# Patient Record
Sex: MALE | Race: BLACK OR AFRICAN AMERICAN | NOT HISPANIC OR LATINO | URBAN - METROPOLITAN AREA
[De-identification: names, ages, dates, MRNs, and addresses within clinical notes are randomized per-mention and may not be internally consistent; named-entity substitution may affect disease eponyms.]

---

## 2017-12-12 ENCOUNTER — EMERGENCY (EMERGENCY)
Facility: HOSPITAL | Age: 42
LOS: 1 days | Discharge: PSYCHIATRIC OR SUBSTANCE USE FACILITY | End: 2017-12-12
Attending: EMERGENCY MEDICINE | Admitting: HOSPITALIST
Payer: MEDICAID

## 2017-12-12 VITALS
DIASTOLIC BLOOD PRESSURE: 85 MMHG | HEART RATE: 96 BPM | TEMPERATURE: 98 F | OXYGEN SATURATION: 98 % | RESPIRATION RATE: 17 BRPM | SYSTOLIC BLOOD PRESSURE: 144 MMHG

## 2017-12-12 PROBLEM — Z00.00 ENCOUNTER FOR PREVENTIVE HEALTH EXAMINATION: Status: ACTIVE | Noted: 2017-12-12

## 2017-12-12 PROCEDURE — 93010 ELECTROCARDIOGRAM REPORT: CPT | Mod: 59

## 2017-12-12 PROCEDURE — 99053 MED SERV 10PM-8AM 24 HR FAC: CPT

## 2017-12-12 PROCEDURE — 99285 EMERGENCY DEPT VISIT HI MDM: CPT | Mod: 25

## 2017-12-12 NOTE — ED ADULT TRIAGE NOTE - CHIEF COMPLAINT QUOTE
pt brought in with police, handcuffed, for acting aggressive and bizarre at home. pt is endorsing feelings of paranoia and "dasia vu". mother states pt has never had behavior like this before. pt denies etoh and illicit drug use. pt c/o of subjective fevers earlier today. denies SI/HI  BH NP called to triage. pt to be seen medically. Charge Rn called. pt brought in with police, handcuffed, for acting aggressive and bizarre at home. pt is endorsing feelings of paranoia and "dasia vu". mother states pt has never had behavior like this before. pt denies etoh and illicit drug use. pt c/o of subjective fevers earlier today. denies SI/HI   NP called to triage. pt to be seen medically. Charge Rn called.  Pt brought to  to change in hospital gown and pants. belongings secured in . wallet given to mother. pt in ambay awaiting room assignment.

## 2017-12-13 DIAGNOSIS — Z29.9 ENCOUNTER FOR PROPHYLACTIC MEASURES, UNSPECIFIED: ICD-10-CM

## 2017-12-13 DIAGNOSIS — R41.82 ALTERED MENTAL STATUS, UNSPECIFIED: ICD-10-CM

## 2017-12-13 DIAGNOSIS — Z86.59 PERSONAL HISTORY OF OTHER MENTAL AND BEHAVIORAL DISORDERS: ICD-10-CM

## 2017-12-13 DIAGNOSIS — F29 UNSPECIFIED PSYCHOSIS NOT DUE TO A SUBSTANCE OR KNOWN PHYSIOLOGICAL CONDITION: ICD-10-CM

## 2017-12-13 DIAGNOSIS — F79 UNSPECIFIED INTELLECTUAL DISABILITIES: ICD-10-CM

## 2017-12-13 LAB
ALBUMIN SERPL ELPH-MCNC: 4.9 G/DL — SIGNIFICANT CHANGE UP (ref 3.3–5)
ALP SERPL-CCNC: 95 U/L — SIGNIFICANT CHANGE UP (ref 40–120)
ALT FLD-CCNC: 16 U/L — SIGNIFICANT CHANGE UP (ref 4–41)
AMMONIA BLD-MCNC: 21 UMOL/L — SIGNIFICANT CHANGE UP (ref 11–55)
AMPHET UR-MCNC: NEGATIVE — SIGNIFICANT CHANGE UP
APAP SERPL-MCNC: < 15 UG/ML — LOW (ref 15–25)
APPEARANCE UR: CLEAR — SIGNIFICANT CHANGE UP
AST SERPL-CCNC: 23 U/L — SIGNIFICANT CHANGE UP (ref 4–40)
B PERT DNA SPEC QL NAA+PROBE: SIGNIFICANT CHANGE UP
BARBITURATES MEASUREMENT: NEGATIVE — SIGNIFICANT CHANGE UP
BARBITURATES UR SCN-MCNC: NEGATIVE — SIGNIFICANT CHANGE UP
BASE EXCESS BLDV CALC-SCNC: 3.6 MMOL/L — SIGNIFICANT CHANGE UP
BASOPHILS # BLD AUTO: 0.04 K/UL — SIGNIFICANT CHANGE UP (ref 0–0.2)
BASOPHILS NFR BLD AUTO: 0.4 % — SIGNIFICANT CHANGE UP (ref 0–2)
BENZODIAZ SERPL-MCNC: NEGATIVE — SIGNIFICANT CHANGE UP
BENZODIAZ UR-MCNC: NEGATIVE — SIGNIFICANT CHANGE UP
BILIRUB SERPL-MCNC: 2.2 MG/DL — HIGH (ref 0.2–1.2)
BILIRUB UR-MCNC: NEGATIVE — SIGNIFICANT CHANGE UP
BLOOD GAS VENOUS - CREATININE: 1.16 MG/DL — SIGNIFICANT CHANGE UP (ref 0.5–1.3)
BLOOD UR QL VISUAL: NEGATIVE — SIGNIFICANT CHANGE UP
BUN SERPL-MCNC: 15 MG/DL — SIGNIFICANT CHANGE UP (ref 7–23)
C PNEUM DNA SPEC QL NAA+PROBE: NOT DETECTED — SIGNIFICANT CHANGE UP
CALCIUM SERPL-MCNC: 9.7 MG/DL — SIGNIFICANT CHANGE UP (ref 8.4–10.5)
CANNABINOIDS UR-MCNC: NEGATIVE — SIGNIFICANT CHANGE UP
CHLORIDE BLDV-SCNC: 105 MMOL/L — SIGNIFICANT CHANGE UP (ref 96–108)
CHLORIDE SERPL-SCNC: 103 MMOL/L — SIGNIFICANT CHANGE UP (ref 98–107)
CO2 SERPL-SCNC: 25 MMOL/L — SIGNIFICANT CHANGE UP (ref 22–31)
COCAINE METAB.OTHER UR-MCNC: NEGATIVE — SIGNIFICANT CHANGE UP
COLOR SPEC: YELLOW — SIGNIFICANT CHANGE UP
CREAT SERPL-MCNC: 1.2 MG/DL — SIGNIFICANT CHANGE UP (ref 0.5–1.3)
EOSINOPHIL # BLD AUTO: 0.1 K/UL — SIGNIFICANT CHANGE UP (ref 0–0.5)
EOSINOPHIL NFR BLD AUTO: 1.1 % — SIGNIFICANT CHANGE UP (ref 0–6)
ETHANOL BLD-MCNC: < 10 MG/DL — SIGNIFICANT CHANGE UP
FLUAV H1 2009 PAND RNA SPEC QL NAA+PROBE: NOT DETECTED — SIGNIFICANT CHANGE UP
FLUAV H1 RNA SPEC QL NAA+PROBE: NOT DETECTED — SIGNIFICANT CHANGE UP
FLUAV H3 RNA SPEC QL NAA+PROBE: NOT DETECTED — SIGNIFICANT CHANGE UP
FLUAV SUBTYP SPEC NAA+PROBE: SIGNIFICANT CHANGE UP
FLUBV RNA SPEC QL NAA+PROBE: NOT DETECTED — SIGNIFICANT CHANGE UP
GAS PNL BLDV: 138 MMOL/L — SIGNIFICANT CHANGE UP (ref 136–146)
GLUCOSE BLDV-MCNC: 108 — HIGH (ref 70–99)
GLUCOSE SERPL-MCNC: 106 MG/DL — HIGH (ref 70–99)
GLUCOSE UR-MCNC: NEGATIVE — SIGNIFICANT CHANGE UP
HADV DNA SPEC QL NAA+PROBE: NOT DETECTED — SIGNIFICANT CHANGE UP
HCO3 BLDV-SCNC: 26 MMOL/L — SIGNIFICANT CHANGE UP (ref 20–27)
HCOV 229E RNA SPEC QL NAA+PROBE: NOT DETECTED — SIGNIFICANT CHANGE UP
HCOV HKU1 RNA SPEC QL NAA+PROBE: NOT DETECTED — SIGNIFICANT CHANGE UP
HCOV NL63 RNA SPEC QL NAA+PROBE: NOT DETECTED — SIGNIFICANT CHANGE UP
HCOV OC43 RNA SPEC QL NAA+PROBE: NOT DETECTED — SIGNIFICANT CHANGE UP
HCT VFR BLD CALC: 40.4 % — SIGNIFICANT CHANGE UP (ref 39–50)
HCT VFR BLDV CALC: 43.9 % — SIGNIFICANT CHANGE UP (ref 39–51)
HGB BLD-MCNC: 14 G/DL — SIGNIFICANT CHANGE UP (ref 13–17)
HGB BLDV-MCNC: 14.3 G/DL — SIGNIFICANT CHANGE UP (ref 13–17)
HMPV RNA SPEC QL NAA+PROBE: NOT DETECTED — SIGNIFICANT CHANGE UP
HPIV1 RNA SPEC QL NAA+PROBE: NOT DETECTED — SIGNIFICANT CHANGE UP
HPIV2 RNA SPEC QL NAA+PROBE: NOT DETECTED — SIGNIFICANT CHANGE UP
HPIV3 RNA SPEC QL NAA+PROBE: NOT DETECTED — SIGNIFICANT CHANGE UP
HPIV4 RNA SPEC QL NAA+PROBE: NOT DETECTED — SIGNIFICANT CHANGE UP
IMM GRANULOCYTES # BLD AUTO: 0.01 # — SIGNIFICANT CHANGE UP
IMM GRANULOCYTES NFR BLD AUTO: 0.1 % — SIGNIFICANT CHANGE UP (ref 0–1.5)
KETONES UR-MCNC: HIGH
LACTATE BLDV-MCNC: 1.8 MMOL/L — SIGNIFICANT CHANGE UP (ref 0.5–2)
LEUKOCYTE ESTERASE UR-ACNC: NEGATIVE — SIGNIFICANT CHANGE UP
LYMPHOCYTES # BLD AUTO: 2.12 K/UL — SIGNIFICANT CHANGE UP (ref 1–3.3)
LYMPHOCYTES # BLD AUTO: 23.4 % — SIGNIFICANT CHANGE UP (ref 13–44)
M PNEUMO DNA SPEC QL NAA+PROBE: NOT DETECTED — SIGNIFICANT CHANGE UP
MCHC RBC-ENTMCNC: 29.9 PG — SIGNIFICANT CHANGE UP (ref 27–34)
MCHC RBC-ENTMCNC: 34.7 % — SIGNIFICANT CHANGE UP (ref 32–36)
MCV RBC AUTO: 86.1 FL — SIGNIFICANT CHANGE UP (ref 80–100)
METHADONE UR-MCNC: NEGATIVE — SIGNIFICANT CHANGE UP
MONOCYTES # BLD AUTO: 0.78 K/UL — SIGNIFICANT CHANGE UP (ref 0–0.9)
MONOCYTES NFR BLD AUTO: 8.6 % — SIGNIFICANT CHANGE UP (ref 2–14)
MUCOUS THREADS # UR AUTO: SIGNIFICANT CHANGE UP
NEUTROPHILS # BLD AUTO: 6 K/UL — SIGNIFICANT CHANGE UP (ref 1.8–7.4)
NEUTROPHILS NFR BLD AUTO: 66.4 % — SIGNIFICANT CHANGE UP (ref 43–77)
NITRITE UR-MCNC: NEGATIVE — SIGNIFICANT CHANGE UP
NON-SQ EPI CELLS # UR AUTO: <1 — SIGNIFICANT CHANGE UP
NRBC # FLD: 0.02 — SIGNIFICANT CHANGE UP
OPIATES UR-MCNC: NEGATIVE — SIGNIFICANT CHANGE UP
OXYCODONE UR-MCNC: NEGATIVE — SIGNIFICANT CHANGE UP
PCO2 BLDV: 48 MMHG — SIGNIFICANT CHANGE UP (ref 41–51)
PCP UR-MCNC: NEGATIVE — SIGNIFICANT CHANGE UP
PH BLDV: 7.39 PH — SIGNIFICANT CHANGE UP (ref 7.32–7.43)
PH UR: 6 — SIGNIFICANT CHANGE UP (ref 4.6–8)
PLATELET # BLD AUTO: 294 K/UL — SIGNIFICANT CHANGE UP (ref 150–400)
PMV BLD: 10.6 FL — SIGNIFICANT CHANGE UP (ref 7–13)
PO2 BLDV: 38 MMHG — SIGNIFICANT CHANGE UP (ref 35–40)
POTASSIUM BLDV-SCNC: 3.7 MMOL/L — SIGNIFICANT CHANGE UP (ref 3.4–4.5)
POTASSIUM SERPL-MCNC: 4.3 MMOL/L — SIGNIFICANT CHANGE UP (ref 3.5–5.3)
POTASSIUM SERPL-SCNC: 4.3 MMOL/L — SIGNIFICANT CHANGE UP (ref 3.5–5.3)
PROT SERPL-MCNC: 8 G/DL — SIGNIFICANT CHANGE UP (ref 6–8.3)
PROT UR-MCNC: 30 MG/DL — HIGH
RBC # BLD: 4.69 M/UL — SIGNIFICANT CHANGE UP (ref 4.2–5.8)
RBC # FLD: 13.1 % — SIGNIFICANT CHANGE UP (ref 10.3–14.5)
RBC CASTS # UR COMP ASSIST: SIGNIFICANT CHANGE UP (ref 0–?)
RSV RNA SPEC QL NAA+PROBE: NOT DETECTED — SIGNIFICANT CHANGE UP
RV+EV RNA SPEC QL NAA+PROBE: NOT DETECTED — SIGNIFICANT CHANGE UP
SALICYLATES SERPL-MCNC: < 5 MG/DL — LOW (ref 15–30)
SAO2 % BLDV: 66.7 % — SIGNIFICANT CHANGE UP (ref 60–85)
SODIUM SERPL-SCNC: 144 MMOL/L — SIGNIFICANT CHANGE UP (ref 135–145)
SP GR SPEC: > 1.04 — HIGH (ref 1–1.04)
TSH SERPL-MCNC: 3.1 UIU/ML — SIGNIFICANT CHANGE UP (ref 0.27–4.2)
UROBILINOGEN FLD QL: NORMAL MG/DL — SIGNIFICANT CHANGE UP
WBC # BLD: 9.05 K/UL — SIGNIFICANT CHANGE UP (ref 3.8–10.5)
WBC # FLD AUTO: 9.05 K/UL — SIGNIFICANT CHANGE UP (ref 3.8–10.5)
WBC UR QL: SIGNIFICANT CHANGE UP (ref 0–?)

## 2017-12-13 PROCEDURE — 70496 CT ANGIOGRAPHY HEAD: CPT | Mod: 26

## 2017-12-13 PROCEDURE — 70498 CT ANGIOGRAPHY NECK: CPT | Mod: 26,52

## 2017-12-13 RX ORDER — DIPHENHYDRAMINE HCL 50 MG
50 CAPSULE ORAL EVERY 6 HOURS
Qty: 0 | Refills: 0 | Status: DISCONTINUED | OUTPATIENT
Start: 2017-12-13 | End: 2017-12-14

## 2017-12-13 RX ORDER — DIPHENHYDRAMINE HCL 50 MG
50 CAPSULE ORAL EVERY 6 HOURS
Qty: 0 | Refills: 0 | Status: DISCONTINUED | OUTPATIENT
Start: 2017-12-13 | End: 2017-12-13

## 2017-12-13 RX ORDER — INFLUENZA VIRUS VACCINE 15; 15; 15; 15 UG/.5ML; UG/.5ML; UG/.5ML; UG/.5ML
0.5 SUSPENSION INTRAMUSCULAR ONCE
Qty: 0 | Refills: 0 | Status: DISCONTINUED | OUTPATIENT
Start: 2017-12-13 | End: 2017-12-14

## 2017-12-13 RX ORDER — HALOPERIDOL DECANOATE 100 MG/ML
5 INJECTION INTRAMUSCULAR EVERY 6 HOURS
Qty: 0 | Refills: 0 | Status: DISCONTINUED | OUTPATIENT
Start: 2017-12-13 | End: 2017-12-14

## 2017-12-13 RX ADMIN — Medication 2 MILLIGRAM(S): at 09:00

## 2017-12-13 NOTE — H&P ADULT - PROBLEM SELECTOR PLAN 1
Patient with psychotic features of paranoia and hallucinations the last two days. Currently he is not displaying either, however there is unclear etiology for change in mental status  - Serum Tox, Urine Tox, Ammonia, and TSH all WNL  - Awaiting BCx and UCx results  - CT Neuro imaging unremarkable  - Lower on the differential may be Autoimmune encephalitis, however patient is lucid right now and had received Ativan this morning. Will await for rest of blood tests to result and evaluate for necessity of Neurology consultation and LP.

## 2017-12-13 NOTE — ED BEHAVIORAL HEALTH ASSESSMENT NOTE - SUMMARY
Patient is a 42 year old male, domiciled, employed, non-caregiver, with no clear PPH but with Developmental Delay (unclear severity), no known prior psychiatric hospitalizations, no current outpatient treatment, denies hx of self harm behaviors, denies prior suicide attempts, denies hx of violence or arrests, denies trauma, denies substance use/abuse, with no relevant past medical history but recent subjective report of fevers, brought in by EMS, presenting with new onset paranoia, VH, and aggression.    Pt with new onset paranoia, possible VH, recent aggression, and initially with disorientation and subjective report of recent fevers (in ER 99.3F). While a primary psychotic disorder cannot be ruled out, pt should have a thorough Patient is a 42 year old male, domiciled, employed, non-caregiver, with no clear PPH but with Developmental Delay (unclear severity), no known prior psychiatric hospitalizations, no current outpatient treatment, denies hx of self harm behaviors, denies prior suicide attempts, denies hx of violence or arrests, denies trauma, denies substance use/abuse, with no relevant past medical history but recent subjective report of fevers, brought in by EMS, presenting with new onset paranoia, VH, and aggression.    Pt with new onset paranoia, possible VH, recent aggression, and initially with disorientation and subjective report of recent fevers (in ER 99.3F). While a primary psychotic disorder cannot be ruled out, pt should have a thorough medical work up at this time to rule out organic etiology of symptoms. Of note, pt is significantly intellectually limited and struggles to clearly describe his symptoms and feelings. Patient is a 42 year old male, domiciled, employed, non-caregiver, with no clear PPH but with Developmental Delay (unclear severity), no known prior psychiatric hospitalizations, no current outpatient treatment, denies hx of self harm behaviors, denies prior suicide attempts, denies hx of violence or arrests, denies trauma, denies substance use/abuse, with no relevant past medical history but recent subjective report of fevers, brought in by EMS, presenting with new onset paranoia, VH, and aggression.    Pt with new onset paranoia, possible VH, recent aggression, and initially with disorientation and subjective report of recent fevers (in ER 99.3F). While a primary psychotic disorder cannot be ruled out, it is unlikely given pt's age and lack of psychotic symptoms/diagnosis prior to this episode. Discussed with medical attending and it was determined that pt should have a thorough medical work up at this time to rule out organic etiology of symptoms. Of note, pt is significantly intellectually limited and struggles to clearly describe his symptoms and feelings, resulting in unclear interview and possibility that pt is at his baseline with social anxiety, descriptive imagination, concrete thinking, and poor impulse control/frustration tolerance. Further information must be obtained from pt's mother or other sources to determine his true baseline prior to this incident.

## 2017-12-13 NOTE — ED PROVIDER NOTE - OBJECTIVE STATEMENT
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  Pt missed work the past two days states, "he sees people chasing him."  Pt was missing for two days called parents who picked him up at a [employer].  Pt became aggressive and required restraint was brought to the ED by PD. PT arrives AO to person and place, believes it is 1978. No neck stiffness, photophobia, rash, fever, headache, nausea, or vomiting.  PT describes a history of a head strike by an elevator at work 1 month ago and left sided neck pain today.
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
## 2017-12-13 NOTE — H&P ADULT - ASSESSMENT
42M hx of Developmental Delay presented to [facility] ED for c/o visual hallucinations. Patient being admitted for medical workup for psychosis 42M hx of Developmental Delay and Depression presented to [facility] ED for c/o visual hallucinations. Patient being admitted for medical workup for psychosis

## 2017-12-13 NOTE — ED PROVIDER NOTE - MEDICAL DECISION MAKING DETAILS
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  VSS WNL.  Exam unremarkable.  No signs of infection, no fever, travel, or exposure.  Meningitis/encephalitis unlikely.  Given visual hallucinations delirium high on the differential.  DDX Toxicity, hypothyroidism, encephalopathy, traumatic neck injury although there are no signs of trauma on exam, R/O carotid injury.  Primary psychosis diagnosis of exclusion.  Rule out medical causes, clear for psychiatric evaluation, treat symptomatically.  Admit.

## 2017-12-13 NOTE — ED BEHAVIORAL HEALTH ASSESSMENT NOTE - DIFFERENTIAL
Organic etiology of symptoms (such as anti-NMDA receptor encephalitis) vs Primary psychotic disorder vs Intellectual disability with pt at baseline functioning (pt's mother stated this is not the case but unclear how reliable she is at this time)

## 2017-12-13 NOTE — H&P ADULT - PROBLEM SELECTOR PLAN 3
Hx of intellectual disability, was in special education as a child. Consent for any future procedures and disposition will need to involve patient's HCP.

## 2017-12-13 NOTE — H&P ADULT - NSHPLABSRESULTS_GEN_ALL_CORE
CBC Full  -  ( 13 Dec 2017 01:56 )  WBC Count : 9.05 K/uL  Hemoglobin : 14.0 g/dL  Hematocrit : 40.4 %  Platelet Count - Automated : 294 K/uL  Mean Cell Volume : 86.1 fL  Mean Cell Hemoglobin : 29.9 pg  Mean Cell Hemoglobin Concentration : 34.7 %  Auto Neutrophil # : 6.00 K/uL  Auto Lymphocyte # : 2.12 K/uL  Auto Monocyte # : 0.78 K/uL  Auto Eosinophil # : 0.10 K/uL  Auto Basophil # : 0.04 K/uL  Auto Neutrophil % : 66.4 %  Auto Lymphocyte % : 23.4 %  Auto Monocyte % : 8.6 %  Auto Eosinophil % : 1.1 %  Auto Basophil % : 0.4 %    12-13    144  |  103  |  15  ----------------------------<  106<H>  4.3   |  25  |  1.20    Ca    9.7      13 Dec 2017 01:56    TPro  8.0  /  Alb  4.9  /  TBili  2.2<H>  /  DBili  x   /  AST  23  /  ALT  16  /  AlkPhos  95  12-13    Thyroid Stimulating Hormone, Serum (12.13.17 @ 01:56)    Thyroid Stimulating Hormone, Serum: 3.10 uIU/mL    Ammonia, Serum (12.13.17 @ 01:54)    Ammonia, Serum: 21    Toxicology Screen, Drugs of Abuse, Urine (12.13.17 @ 07:12)    Phencyclidine Level, Urine: NEGATIVE    Amphetamine, Urine: NEGATIVE    Barbiturates Screen, Urine: NEGATIVE    Benzodiazepine, Urine: NEGATIVE    Cannabinoids, Urine: NEGATIVE    Cocaine Metabolite, Urine: NEGATIVE    Methadone, Urine: NEGATIVE    Opiate, Urine: NEGATIVE    Oxycodone, Urine: NEGATIVE    Toxicology Screen, Drugs of Abuse, Serum (12.13.17 @ 01:56)    Barbiturates Measurement: NEGATIVE    Benzodiazepines: NEGATIVE    Rapid Respiratory Viral Panel (12.13.17 @ 01:56)    Adenovirus (RapRVP): NOT DETECTED    Influenza A (RapRVP): NOT DETECTED (any subtype)    Influenza AH1 2009 (RapRVP): NOT DETECTED    Influenza AH1 (RapRVP): NOT DETECTED    Influenza AH3 (RapRVP): NOT DETECTED    Influenza B (RapRVP): NOT DETECTED    Parainfluenza 1 (RapRVP): NOT DETECTED    Parainfluenza 2 (RapRVP): NOT DETECTED    Parainfluenza 3 (RapRVP): NOT DETECTED    Parainfluenza 4 (RapRVP): NOT DETECTED    Resp Syncytial Virus (RapRVP): NOT DETECTED    Bordetella pertussis (RapRVP): NOT DETECTED    Chlamydia pneumoniae (RapRVP): NOT DETECTED    Mycoplasma pneumoniae (RapRVP): NOT DETECTED     Entero/Rhinovirus (RapRVP): NOT DETECTED    HKU1 Coronavirus (RapRVP): NOT DETECTED    NL63 Coronavirus (RapRVP): NOT DETECTED    229E Coronavirus (RapRVP): NOT DETECTED    OC43 Coronavirus (RapRVP): NOT DETECTED    hMPV (RapRVP): NOT DETECTED    < from: CT Angio Neck w/ IV Cont (12.13.17 @ 02:20) >  IMPRESSION:  Noncontrast head CT:   No acute intracranial hemorrhage, mass effect or evidence of acute   territorial infarct.  CTA head and neck: Unremarkable examinations. No major vessel occlusion,   hemodynamically significant stenosis, dissection or aneurysm in the head   or neck.  Evaluation for stenosis in the neck is in keeping with the NASCET   criteria.  NANDA MICHEL M.D., RADIOLOGIST  This document has been electronically signed. Dec 13 2017  4:08AM  < end of copied text >

## 2017-12-13 NOTE — H&P ADULT - NSHPPHYSICALEXAM_GEN_ALL_CORE
Vital Signs Last 24 Hrs  T(C): 37.4 (13 Dec 2017 05:39), Max: 37.4 (13 Dec 2017 05:39)  T(F): 99.3 (13 Dec 2017 05:39), Max: 99.3 (13 Dec 2017 05:39)  HR: 78 (13 Dec 2017 05:39) (78 - 96)  BP: 137/81 (13 Dec 2017 05:39) (137/81 - 149/90)  BP(mean): --  RR: 18 (13 Dec 2017 05:39) (17 - 18)  SpO2: 100% (13 Dec 2017 05:39) (98% - 100%) Vital Signs Last 24 Hrs  T(C): 37.4 (13 Dec 2017 05:39), Max: 37.4 (13 Dec 2017 05:39)  T(F): 99.3 (13 Dec 2017 05:39), Max: 99.3 (13 Dec 2017 05:39)  HR: 78 (13 Dec 2017 05:39) (78 - 96)  BP: 137/81 (13 Dec 2017 05:39) (137/81 - 149/90)  BP(mean): --  RR: 18 (13 Dec 2017 05:39) (17 - 18)  SpO2: 100% (13 Dec 2017 05:39) (98% - 100%)    General: NAD, non-toxic appearing, seems to ponder answers to questions first before answering them  HEENT: EOMI, PERRLA, no conjunctival pallor, MMM, no JVD, no thyromegaly, neck supple, trachea midline  CV: S1S2 RRR no MRG  Lungs: CTA BL  Abdomen: soft NTND +BS   Extremities: No CCE +WWP  Skin/MSK: No rashes, preserved ROM on active & passive movement  Neuro: AAOx3, no focal deficits (5/5 strength all extremities), no sensory deficits

## 2017-12-13 NOTE — ED BEHAVIORAL HEALTH ASSESSMENT NOTE - DETAILS
Pt was aggressive with mom today [id number] VM left, signed out on T-drive Attempted to contact pt's mother, SERGIO left

## 2017-12-13 NOTE — PROGRESS NOTE BEHAVIORAL HEALTH - NSBHCHARTREVIEWLAB_PSY_A_CORE FT
14.0   9.05  )-----------( 294      ( 13 Dec 2017 01:56 )             40.4     13 Dec 2017 01:56    144    |  103    |  15     ----------------------------<  106    4.3     |  25     |  1.20     Ca    9.7        13 Dec 2017 01:56    TPro  8.0    /  Alb  4.9    /  TBili  2.2    /  DBili  x      /  AST  23     /  ALT  16     /  AlkPhos  95     13 Dec 2017 01:56    Toxicology Screen, Drugs of Abuse, Urine (12.13.17 @ 07:12)    Phencyclidine Level, Urine: NEGATIVE    Amphetamine, Urine: NEGATIVE: PH = 5.5    Barbiturates Screen, Urine: NEGATIVE    Benzodiazepine, Urine: NEGATIVE    Cannabinoids, Urine: NEGATIVE    Cocaine Metabolite, Urine: NEGATIVE    Methadone, Urine: NEGATIVE    Opiate, Urine: NEGATIVE    Oxycodone, Urine: NEGATIVE:    Toxicology Screen, Drugs of Abuse, Serum (12.13.17 @ 01:56)    Barbiturates Measurement: NEGATIVE    Benzodiazepines: NEGATIVE:   TEST                       CUT OFF VALUE  ----                       -------------  BARBITURATES               <  0.5 ug/mL  BENZODIAZEPINES            <  200 ng/mL  Acetaminophen Level, Serum: < 15.0: ACETAMINOPHEN VALUES ARE RELATED TO TIME OF INGESTION.

## 2017-12-13 NOTE — ED PROVIDER NOTE - PROGRESS NOTE DETAILS
JW PT now AOx3 states he feels someone is on the phone talking to his parents when he is speaking with them.  Pt states he has feelings of Gladis Vu like this has all happened before.

## 2017-12-13 NOTE — ED PROVIDER NOTE - ATTENDING CONTRIBUTION TO CARE
43 yo with DD presenting with family for a period of erratic and atypical behavior for this patient.  Family was not present during my history.  The patient reports a number of different pervasive paranoias of people talking about him and thinking other people know something about him that they are not telling him.  Possibly had a fever the other day.  Otherwise denies any pain, NVD or recent travel.  Denies all tob and substance abuse.  Does report occasional ETOH.  No recent trauma.  Gen: Well appearing in NAD  Head: NC/AT  Neck: trachea midline  Resp:  No distress CTAB  CV: RRR  Ext: no deformities  Neuro:  A&O appears non focal  Skin:  Warm and dry as visualized  Psych:  Off affect, slow speech, perseverates on his own voice, no SI HI AH or VH    pt with presentation concerning for a delirium.  Will do broad workup for metabolic causes.  This is mor likely than psych as an etiology.    CT and labs and serum tox are all negative.  Pt still not at baseline.  Will require admission for social and inpatient psych eval.  Per  team they do not feel that the patient currently meets criteria for involuntary admission.

## 2017-12-13 NOTE — ED BEHAVIORAL HEALTH ASSESSMENT NOTE - DESCRIPTION
Patient was calm and cooperative in the ED and did not exhibit any aggression. Pt did not require any prn medications or any physical restraints.     Vital Signs Last 24 Hrs  T(C): 37.4 (13 Dec 2017 05:39), Max: 37.4 (13 Dec 2017 05:39)  T(F): 99.3 (13 Dec 2017 05:39), Max: 99.3 (13 Dec 2017 05:39)  HR: 78 (13 Dec 2017 05:39) (78 - 96)  BP: 137/81 (13 Dec 2017 05:39) (137/81 - 149/90)  BP(mean): --  RR: 18 (13 Dec 2017 05:39) (17 - 18)  SpO2: 100% (13 Dec 2017 05:39) (98% - 100%) See HPI Developmental Delay

## 2017-12-13 NOTE — ED BEHAVIORAL HEALTH ASSESSMENT NOTE - RISK ASSESSMENT
Risk factors: Single, male, developmental disability, anxiety, impulsivity, paranoia, occupational decline, absence of outpatient follow-up, limited insight into symptoms, difficulty expressing thoughts/symptoms/emotions.     Protective factors: Denies any active suicidal ideation/intent/plan, no hx of prior attempts, no self-harm behaviors, has responsibility to family and others, identifies reasons for living, future oriented, supportive social network or family, no active substance use, no access to firearms, no legal issues, no hx of abuse and motivation to participate in care.

## 2017-12-13 NOTE — PROGRESS NOTE BEHAVIORAL HEALTH - CASE SUMMARY
41 y/o employed M w/ intellectual disability, who was missing for two days until being found by his parents at a [employer] and then proceeding to become aggressive, was brought in to ED. In ED he reported new onset paranoia, VH, and aggression. Patient was admitted to medicine for medical workup of psychotic symptoms. Psychiatry consulted to assess and treat psychotic sx. Pt. seen and evaluated, AAOX3. Pt. was cooperative after receiving PRN medication. He stated that he lives with his cousin. Pt. was not able to  recall/express what he was doing PTA. He stated that he remembered feeling sick and stated that eh took Advil for fever. Pt. denies AH and VH, was giggling during an interview. Denies SI and HI. Pt. reported some vague paranoia about staff at work. When asked if he is feeling safe in the hospital, he gave very vague answers. Patient denies taking any medications at home, no stiffness noted on exam. Patient is somewhat limited and inconsistent historian, giving concrete answers. Not clear what pt.'s baseline is, also it is unclear if the reported sxs are part of his intellectual disability. It is unlikely that pt. has acute onset of psychosis at this age, will recommend to r/o underlying medical causes contributing to current sxs. Differential includes underlying medical etiologies vs 43 y/o employed M w/ intellectual disability, who was missing for two days until being found by his parents at a [employer] and then proceeding to become aggressive, was brought in to ED. In ED he reported new onset paranoia, VH, and aggression. Patient was admitted to medicine for medical workup of psychotic symptoms. Psychiatry consulted to assess and treat psychotic sx. Pt. seen and evaluated, AAOX3. Pt. was cooperative after receiving PRN medication. He stated that he lives with his cousin. Pt. was not able to  recall/express what he was doing PTA. He stated that he remembered feeling sick and that he took Advil for fever. Pt. denies AH and VH, was giggling during an interview. Denies SI and HI. Pt. reported some  paranoia about staff at work. When asked if he is feeling safe in the hospital, he gave very vague answers. Patient denies taking any medications at home, denies use of any street drugs, reported drinks alcohol socially, last drink was a month back. No stiffness or rigidity noted on exam. Patient is somewhat limited and inconsistent/poor historian, giving concrete and limited answers. Not clear what pt.'s baseline is, also it is unclear if the reported sxs are part of his intellectual disability. It is unlikely that pt. has first onset of psychosis at this age, will recommend to r/o underlying medical causes contributing to current sxs. Differential includes underlying medical etiologies vs intellectual impairment with behavioral issues vs delirium (pt. thought it was 1978 in ER, now AAOX3)  vs psychosis vs substance intoxication/withdrawal. Recommend PRNs as above, collateral needed for family, left  message for mother. Case discussed with primary team, Dr. Nathan, will follow

## 2017-12-13 NOTE — ED BEHAVIORAL HEALTH ASSESSMENT NOTE - OTHER
Normal while in ER, previously impaired when pt ran away for 2 days [place] Unclear if pt is experiencing VH/AH at this time; Pt's reports change between providers and description of experiences are not conclusive. Unclear

## 2017-12-13 NOTE — ED PROVIDER NOTE - CARE PLAN
Principal Discharge DX:	Altered mental status, unspecified altered mental status type  Secondary Diagnosis:	Paranoia

## 2017-12-13 NOTE — PROGRESS NOTE BEHAVIORAL HEALTH - SUMMARY
41 y/o employed M w/ intellectual disability, who was missing for two days until being found by his parents at a [employer] and then proceeding to become aggressive, was brought in to ED in handcuffs by police. In ED he reported new onset paranoia, VH, and aggression. Patient was admitted to medicine for medical workup of psychotic symptoms. Psychiatry consulted to assess and treat psychotic sx.    On initial evaluation patient was a very poor historian, struggling to recall things from even last week and at times seemed to be fabricating answers to questions. He was also somewhat disoriented (thought it was 1978) but reported being unable to work for the past few days to paranoia.     Today he was initially agitated (yelling and clenching fists w/o any clear target) and required PRN medication. He was later amendable to interview and found to be A&Ox3. He provides some He endorses paranoia PTA of unclear duration. He denied AV hallucinations but at times appeared to be responding to internal stimuli. He reports poor sleep but cannot further clarify. Again attempted to contact patient's mother and left voicemail to obtain collateral to further assess this patient who is a somewhat limited and inconsistent historian.    Will continue w/ Haldol 5/Ativan 2/Benadryl 50 PO/IV/IM q6h for agitation. Will consider standing medication once collateral is obtained. 41 y/o employed M w/ intellectual disability, who was missing for two days until being found by his parents at a [employer] and then proceeding to become aggressive, was brought in to ED in handcuffs by police. In ED he reported new onset paranoia, VH, and aggression. Patient was admitted to medicine for medical workup of psychotic symptoms. Psychiatry consulted to assess and treat psychotic sx.    On initial evaluation patient was a very poor historian, struggling to recall things from even last week and at times seemed to be fabricating answers to questions. He was also somewhat disoriented (thought it was 1978) but reported being unable to work for the past few days to paranoia.     Today he was initially agitated (yelling and clenching fists w/o any clear target) and required PRN medication. He was later amendable to interview and found to be A&Ox3. He does not recall being away from home PTA though cannot recall/express what he was doing PTA. He endorses paranoia PTA of unclear duration. He denied AV hallucinations but at times appeared to be responding to internal stimuli. He reports poor sleep but cannot further clarify. Again attempted to contact patient's mother and left voicemail to obtain collateral to further assess this patient who is a somewhat limited and inconsistent historian.    Will continue w/ Haldol 5/Ativan 2/Benadryl 50 PO/IV/IM q6h for agitation. Will consider standing medication once collateral is obtained. 43 y/o employed M w/ intellectual disability, who was missing for two days until being found by his parents at a [employer] and then proceeding to become aggressive, was brought in to ED in handcuffs by police. In ED he reported new onset paranoia, VH, and aggression. Patient was admitted to medicine for medical workup of psychotic symptoms. Psychiatry consulted to assess and treat psychotic sx.    On initial evaluation patient was a very poor historian, struggling to recall things from even last week and at times seemed to be fabricating answers to questions. He was also somewhat disoriented (thought it was 1978) but reported being unable to work for the past few days due to paranoia.     Today he was initially agitated (yelling and clenching fists w/o any clear target) and required PRN medication. He was later amendable to interview and found to be A&Ox3. He does not recall being away from home PTA though cannot recall/express what he was doing PTA. He endorses paranoia PTA of unclear duration. He denied AV hallucinations but at times appeared to be responding to internal stimuli. He reports poor sleep but cannot further clarify. Again attempted to contact patient's mother and left voicemail to obtain collateral to further assess this patient who is a somewhat limited and inconsistent historian.    Will continue w/ Haldol 5/Ativan 2/Benadryl 50 PO/IV/IM q6h for agitation. Will consider standing medication once collateral is obtained.

## 2017-12-13 NOTE — H&P ADULT - HISTORY OF PRESENT ILLNESS
42M hx of Developmental Delay presented to [facility] ED for c/o visual hallucinations 42M hx of Developmental Delay, Depression presented to [facility] ED for c/o visual hallucinations. As per patient, for the last 2-3 days the patient did not report to work because he states that he has been "seeing people" vijay him driving him to be paranoid. The patient said he went to hide, and then his parents found him with the help of the police at a [employer]. He has never had issues with paranoia or hallucinations prior to this. During the interview the patient also would periodically ask over and over again "can I go to room 513?" When asked the significance of the room, the patient would blankly stare and not give a reason. He denies any pain, visual changes, cough, diarrhea, constipation, dysuria at this time    Patient also notes, years ago he was taking pills for depression but when he stopped feeling depressed his doctor stopped prescribing him those pills. He has not seen his doctor "in a very long time."

## 2017-12-13 NOTE — PROGRESS NOTE BEHAVIORAL HEALTH - OTHER
initially hostile and uncooperative but cooperative after Ativan [place] Unclear if pt is experiencing VH/AH at this time, is seen laughing to himself, reported AH to other staff [place], though overall able to answer questions Unclear if pt is experiencing VH/AH at this time, is seen laughing to himself, reported AH to other staff, denies AH and Vh at the time of interview

## 2017-12-13 NOTE — ED ADULT NURSE NOTE - NS ED PATIENT SAFETY CONCERN
Infusion Nursing Note:  Remy Boyd Izzy Breaux RN                         No

## 2017-12-13 NOTE — ED BEHAVIORAL HEALTH ASSESSMENT NOTE - PSYCHIATRIC ISSUES AND PLAN (INCLUDE STANDING AND PRN MEDICATION)
Patient requires further evaluation to determine Psychiatric safety to leave hospital. Psychiatry must be consulted to perform risk assessment prior to patient discharge from medicine. PLAN: Will defer standing medications at this time. May utilize Haldol 5mg/Ativan 2mg PO/IM/IV (DO NOT USE HALDOL IVP - BLACK BOX WARNING) Q6H PRN for agitation/anxiety

## 2017-12-13 NOTE — PROGRESS NOTE BEHAVIORAL HEALTH - ORIENTATION OTHER
Initially not oriented to time (stated it was 1978) later was oriented Initially not oriented to time (stated it was 1978) later was oriented, AAOX3 at the time of interview

## 2017-12-13 NOTE — H&P ADULT - PROBLEM SELECTOR PLAN 4
IMPROVE VTE Individual Risk Assessment = 0, low risk no VTE ppx          RISK                                                          Points  [  ] Previous VTE                                               3  [  ] Thrombophilia                                            2  [  ] Lower limb paresis/paralysis                    2    [  ] Active Cancer (in last 6 months)              2   [  ] Immobilization > 24 hrs                             1  [  ] ICU/CCU stay > 24 hours                            1  [  ] Age > 60                                                        1                                            Total Score ___0______

## 2017-12-13 NOTE — ED BEHAVIORAL HEALTH ASSESSMENT NOTE - CASE SUMMARY
42 year old male, domiciled, employed, non-caregiver, with no clear PPH but with Developmental Delay (unclear severity), no known prior psychiatric hospitalizations, no current outpatient treatment, denies hx of self harm behaviors, denies prior suicide attempts, denies hx of violence or arrests, denies trauma, denies substance use/abuse, with no relevant past medical history but recent subjective report of fevers, brought in by EMS, presenting with new onset paranoia, VH, and aggression.    Pt with new onset paranoia, possible VH, recent aggression, and initially with disorientation and subjective report of recent fevers (in ER 99.3F). While a primary psychotic disorder cannot be ruled out, it is unlikely given pt's age and lack of psychotic symptoms/diagnosis prior to this episode. Discussed with medical attending and it was determined that pt should have a thorough medical work up at this time to rule out organic etiology of symptoms. Of note, pt is significantly intellectually limited and struggles to clearly describe his symptoms and feelings, resulting in unclear interview and possibility that pt is at his baseline with social anxiety, descriptive imagination, concrete thinking, and poor impulse control/frustration tolerance. Further information must be obtained from pt's mother or other sources to determine his true baseline prior to this incident.    Agree withe assessment outline above by NP note. It is unclear of what this pt baseline is and if he reported sx are part of his intellectual impairment and the way in expresses himself. In addition first onset psychosis at pt age is extremely rare ( less then 1%) and therefore medical etiology should be r/o which include but not limited to NMDA-encephalopathy, syphilis, thyroid, metabolic, neoplastic, etc which are usually more common sources of new onset psychosis in this age group.

## 2017-12-13 NOTE — ED PROVIDER NOTE - PHYSICAL EXAMINATION
No palpable neck tenderness.  Patient well appearing.  Neck supple without nuchal rigidity or stiffness.  Full range of motion.  Patient remains alert and oriented to person, and place without lethargy. No focal neurological sign.   No petechial rash or skin lesions.  Horizontal Head Jolt Test negative.  Kernig's Sign negative.  Brudzinski's Sign negative.  No obvious skull fracture, depression, hematoma, ecchymosis,  or signs of head trauma.  No palpable skull tenderness or deformity.  No carlson sign, raccoon eyes, CSF rhinorrhea, CSF otorrhea, hematotympanum, or other sign of basilar skull fracture.  No maxillary or facial ecchymosis, hematoma, deformity, or palpable tenderness.   No septal hematoma. No midline cervical, thoracic, or lumbar tenderness, step off, fracture, or deformity.  No sign of thoracic trama.  No sign of abdominal or pelvic trauma.  No sign of extremity trauma.

## 2017-12-13 NOTE — ED ADULT NURSE NOTE - OBJECTIVE STATEMENT
Patient is a 43 y/o male, awake and alert, disorganized thoughts.  Brought in by PD and parents for aggressive behavior and visual hallucinations.  Patient has a history of developmental delays but highly functional and works.  Per parents, patient went missing 2 days ago and missed work, called parents to pick him up at a [employer].  No prior history of psychosis.  IV placed, labs drawn and sent and will continue to monitor patient.

## 2017-12-13 NOTE — ED ADULT NURSE NOTE - CHIEF COMPLAINT QUOTE
pt brought in with police, handcuffed, for acting aggressive and bizarre at home. pt is endorsing feelings of paranoia and "dasia vu". mother states pt has never had behavior like this before. pt denies etoh and illicit drug use. pt c/o of subjective fevers earlier today. denies SI/HI   NP called to triage. pt to be seen medically. Charge Rn called.  Pt brought to  to change in hospital gown and pants. belongings secured in . wallet given to mother. pt in ambay awaiting room assignment.

## 2017-12-13 NOTE — PROGRESS NOTE BEHAVIORAL HEALTH - NSBHCHARTREVIEWIMAGING_PSY_A_CORE FT
< from: CT Angio Head w/ IV Cont (12.13.17 @ 02:20) >    IMPRESSION:  Noncontrast head CT:   No acute intracranial hemorrhage, mass effect or evidence of acute   territorial infarct.    CTA head and neck: Unremarkable examinations. No major vessel occlusion,   hemodynamically significant stenosis, dissection or aneurysm in the head   or neck.    < end of copied text >

## 2017-12-13 NOTE — H&P ADULT - NSHPREVIEWOFSYSTEMS_GEN_ALL_CORE
REVIEW OF SYSTEMS:    CONSTITUTIONAL: No weakness, fevers or chills  EYES/ENT: No visual changes;  No vertigo or throat pain, (+) VISUAL HALLUCINATIONS  NECK: No pain or stiffness  RESPIRATORY: No cough, wheezing, hemoptysis; No shortness of breath  CARDIOVASCULAR: No chest pain or palpitations  GASTROINTESTINAL: No abdominal or epigastric pain. No nausea, vomiting, or hematemesis; No diarrhea or constipation. No melena or hematochezia.  GENITOURINARY: No dysuria, frequency or hematuria  NEUROLOGICAL: No numbness or weakness  SKIN: No itching, burning, rashes, or lesions   All other review of systems is negative unless indicated above.

## 2017-12-13 NOTE — ED BEHAVIORAL HEALTH ASSESSMENT NOTE - HPI (INCLUDE ILLNESS QUALITY, SEVERITY, DURATION, TIMING, CONTEXT, MODIFYING FACTORS, ASSOCIATED SIGNS AND SYMPTOMS)
Patient is a 42 year old male, domiciled, employed, non-caregiver, with no clear PPH but with Developmental Delay (unclear severity), no known prior psychiatric hospitalizations, no current outpatient treatment, denies hx of self harm behaviors, denies prior suicide attempts, denies hx of violence or arrests, denies trauma, denies substance use/abuse, with no relevant past medical history but recent subjective report of fevers, brought in by EMS, presenting with new onset paranoia, VH, and aggression.     Pt initially arrived to ER A/O X2 - only to person/place but stating year as 1978. As per report from medical attending, pt's mother reports that pt's symptoms are new and have never occurred before. Pt reported to medical staff that he felt he was being chased and saw people chasing him. Pt initially planned for medical admission to determine etiology of symptoms, however later became more oriented and concern for primary psychosis instead of organic etiology lead to psychiatric consult being called.     On exam pt is a very poor historian, struggles to recall things from even last week and at times seems to be fabricating answers to questions. Pt states he is here because he feels like people know things about him but are not telling him. Pt states this happened at the same time he started to feel "sick" which he described as feeling shaky, anxious, with pain in his left side. Pt reports belief that there are people telling his mother and "Jason" (assumed to be step-father) what to say and do with the patient. Initially this presented as paranoia, however later questioning revealed that mom was in fact on the phone with someone who was providing her with advice on how to handle patient's symptoms. Pt states this person told mom to give patient two Advil to make him feel better, possibly because he had a fever. Pt also reports another instance of "knowing" that mom/Jason were with someone when he was on the phone with them despite them denying that there was anyone else around, unclear when this happened or if anyone else was with pt's mom/Jason. Pt states he has not been able to work due to his paranoid thoughts, states he felt "sick" so has not been attending work. Pt struggles to report on his recent sleep pattern, states he tries to lay down but gets an urge to leave his house and only slept for a few minutes. Pt unable to state if this happened multiple nights or only once. Pt denies depression, suicidal ideations, intent or plans. Pt denies aggressive or homicidal ideations, intent or plans. Pt states he hopes that the people talking to his mother and the things people know about him are good in nature but is worried that they are bad.     Attempted to reach pt's mother, Hermelinda Chatters [phone], Voicemail left    Psyckes report run with pt's SS# - Found to have old Medicaid ID # [id number] However, medicaid is not active, no claims filed within past 5 years. Patient is a 42 year old male, domiciled, employed, non-caregiver, with no clear PPH but with Developmental Delay (unclear severity), no known prior psychiatric hospitalizations, no current outpatient treatment, denies hx of self harm behaviors, denies prior suicide attempts, denies hx of violence or arrests, denies trauma, denies substance use/abuse, with no relevant past medical history but recent subjective report of fevers, brought in by EMS, presenting with new onset paranoia, VH, and aggression.     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  Pt missed work the past two days states, "he sees people chasing him."  Pt was missing for two days called parents who picked him up at a [employer].  Pt became aggressive and required restraint was brought to the ED by PD. PT arrives AO to person and place, believes it is 1978." As per report from medical attending, pt's mother reports that pt's symptoms are new and have never occurred before. Pt initially planned for medical admission to determine etiology of symptoms, however later became more oriented and concern for primary psychosis instead of organic etiology lead to psychiatric consult being called.     On exam pt is a very poor historian, struggles to recall things from even last week and at times seems to be fabricating answers to questions. Pt states he is here because he feels like people know things about him but are not telling him. Pt states this happened at the same time he started to feel "sick" which he described as feeling shaky, anxious, with pain in his left side. Pt reports belief that there are people telling his mother and "Jason" (assumed to be step-father) what to say and do with the patient. Initially this presented as paranoia, however later questioning revealed that mom was in fact on the phone with someone who was providing her with advice on how to handle patient's symptoms. Pt states this person told mom to give patient two Advil to make him feel better, possibly because he had a fever. Pt also reports another instance of "knowing" that mom/Jason were with someone when he was on the phone with them despite them denying that there was anyone else around, unclear when this happened or if anyone else was with pt's mom/Jason. Pt states he has not been able to work due to his paranoid thoughts, states he felt "sick" so has not been attending work. Pt struggles to report on his recent sleep pattern, states he tries to lay down but gets an urge to leave his house and only slept for a few minutes. Pt unable to state if this happened multiple nights or only once. Pt denies depression, suicidal ideations, intent or plans. Pt denies aggressive or homicidal ideations, intent or plans. Pt states he hopes that the people talking to his mother and the things people know about him are good in nature but is worried that they are bad. Of note, pt has intellectual disability that is prominent during exam and impacts clarity of pt's description of his symptoms. For example, at one time pt described VH of people following him, later stated it was just a feeling, and again later stated it was like a picture in his head.     Attempted to reach pt's mother, Hermelinda Chatters [phone], Voicemail left    Psyckes report run with pt's SS# - Found to have old Medicaid ID # [id number] However, medicaid is not active, no claims filed within past 5 years.

## 2017-12-14 ENCOUNTER — INPATIENT (INPATIENT)
Facility: HOSPITAL | Age: 42
LOS: 7 days | Discharge: HOME | End: 2017-12-22
Attending: PSYCHIATRY & NEUROLOGY | Admitting: PSYCHIATRY & NEUROLOGY
Payer: MEDICAID

## 2017-12-14 ENCOUNTER — TRANSCRIPTION ENCOUNTER (OUTPATIENT)
Age: 42
End: 2017-12-14

## 2017-12-14 VITALS
HEART RATE: 83 BPM | DIASTOLIC BLOOD PRESSURE: 72 MMHG | SYSTOLIC BLOOD PRESSURE: 105 MMHG | OXYGEN SATURATION: 98 % | RESPIRATION RATE: 17 BRPM | TEMPERATURE: 98 F

## 2017-12-14 VITALS
HEART RATE: 105 BPM | SYSTOLIC BLOOD PRESSURE: 110 MMHG | WEIGHT: 176.37 LBS | DIASTOLIC BLOOD PRESSURE: 73 MMHG | TEMPERATURE: 98 F

## 2017-12-14 DIAGNOSIS — F33.9 MAJOR DEPRESSIVE DISORDER, RECURRENT, UNSPECIFIED: ICD-10-CM

## 2017-12-14 LAB
ALBUMIN SERPL ELPH-MCNC: 4.6 G/DL — SIGNIFICANT CHANGE UP (ref 3.3–5)
ALP SERPL-CCNC: 89 U/L — SIGNIFICANT CHANGE UP (ref 40–120)
ALT FLD-CCNC: 17 U/L — SIGNIFICANT CHANGE UP (ref 4–41)
AST SERPL-CCNC: 23 U/L — SIGNIFICANT CHANGE UP (ref 4–40)
BACTERIA UR CULT: SIGNIFICANT CHANGE UP
BILIRUB SERPL-MCNC: 1.8 MG/DL — HIGH (ref 0.2–1.2)
BUN SERPL-MCNC: 12 MG/DL — SIGNIFICANT CHANGE UP (ref 7–23)
CALCIUM SERPL-MCNC: 9.8 MG/DL — SIGNIFICANT CHANGE UP (ref 8.4–10.5)
CHLORIDE SERPL-SCNC: 99 MMOL/L — SIGNIFICANT CHANGE UP (ref 98–107)
CK SERPL-CCNC: 679 U/L — HIGH (ref 30–200)
CO2 SERPL-SCNC: 27 MMOL/L — SIGNIFICANT CHANGE UP (ref 22–31)
CREAT SERPL-MCNC: 1.05 MG/DL — SIGNIFICANT CHANGE UP (ref 0.5–1.3)
GLUCOSE SERPL-MCNC: 89 MG/DL — SIGNIFICANT CHANGE UP (ref 70–99)
HCT VFR BLD CALC: 42.3 % — SIGNIFICANT CHANGE UP (ref 39–50)
HGB BLD-MCNC: 14.3 G/DL — SIGNIFICANT CHANGE UP (ref 13–17)
MCHC RBC-ENTMCNC: 29.5 PG — SIGNIFICANT CHANGE UP (ref 27–34)
MCHC RBC-ENTMCNC: 33.8 % — SIGNIFICANT CHANGE UP (ref 32–36)
MCV RBC AUTO: 87.2 FL — SIGNIFICANT CHANGE UP (ref 80–100)
NRBC # FLD: 0 — SIGNIFICANT CHANGE UP
PLATELET # BLD AUTO: 285 K/UL — SIGNIFICANT CHANGE UP (ref 150–400)
PMV BLD: 10.9 FL — SIGNIFICANT CHANGE UP (ref 7–13)
POTASSIUM SERPL-MCNC: 3.7 MMOL/L — SIGNIFICANT CHANGE UP (ref 3.5–5.3)
POTASSIUM SERPL-SCNC: 3.7 MMOL/L — SIGNIFICANT CHANGE UP (ref 3.5–5.3)
PROT SERPL-MCNC: 8 G/DL — SIGNIFICANT CHANGE UP (ref 6–8.3)
RBC # BLD: 4.85 M/UL — SIGNIFICANT CHANGE UP (ref 4.2–5.8)
RBC # FLD: 12.8 % — SIGNIFICANT CHANGE UP (ref 10.3–14.5)
SODIUM SERPL-SCNC: 140 MMOL/L — SIGNIFICANT CHANGE UP (ref 135–145)
SPECIMEN SOURCE: SIGNIFICANT CHANGE UP
VIT B12 SERPL-MCNC: 630 PG/ML — SIGNIFICANT CHANGE UP (ref 200–900)
WBC # BLD: 6.53 K/UL — SIGNIFICANT CHANGE UP (ref 3.8–10.5)
WBC # FLD AUTO: 6.53 K/UL — SIGNIFICANT CHANGE UP (ref 3.8–10.5)

## 2017-12-14 RX ORDER — RISPERIDONE 4 MG/1
0.5 TABLET ORAL
Qty: 0 | Refills: 0 | Status: DISCONTINUED | OUTPATIENT
Start: 2017-12-14 | End: 2017-12-20

## 2017-12-14 RX ORDER — RISPERIDONE 4 MG/1
1 TABLET ORAL
Qty: 0 | Refills: 0 | COMMUNITY
Start: 2017-12-14

## 2017-12-14 RX ORDER — HALOPERIDOL DECANOATE 100 MG/ML
5 INJECTION INTRAMUSCULAR EVERY 6 HOURS
Qty: 0 | Refills: 0 | Status: DISCONTINUED | OUTPATIENT
Start: 2017-12-14 | End: 2017-12-22

## 2017-12-14 RX ORDER — HALOPERIDOL DECANOATE 100 MG/ML
5 INJECTION INTRAMUSCULAR ONCE
Qty: 0 | Refills: 0 | Status: DISCONTINUED | OUTPATIENT
Start: 2017-12-14 | End: 2017-12-22

## 2017-12-14 RX ORDER — RISPERIDONE 4 MG/1
0.5 TABLET ORAL ONCE
Qty: 0 | Refills: 0 | Status: COMPLETED | OUTPATIENT
Start: 2017-12-14 | End: 2017-12-14

## 2017-12-14 RX ORDER — RISPERIDONE 4 MG/1
0.5 TABLET ORAL
Qty: 0 | Refills: 0 | Status: DISCONTINUED | OUTPATIENT
Start: 2017-12-14 | End: 2017-12-14

## 2017-12-14 RX ORDER — DIPHENHYDRAMINE HCL 50 MG
50 CAPSULE ORAL EVERY 6 HOURS
Qty: 0 | Refills: 0 | Status: DISCONTINUED | OUTPATIENT
Start: 2017-12-14 | End: 2017-12-22

## 2017-12-14 RX ORDER — DIPHENHYDRAMINE HCL 50 MG
50 CAPSULE ORAL ONCE
Qty: 0 | Refills: 0 | Status: DISCONTINUED | OUTPATIENT
Start: 2017-12-14 | End: 2017-12-22

## 2017-12-14 RX ADMIN — Medication 2 MILLIGRAM(S): at 09:37

## 2017-12-14 RX ADMIN — Medication 50 MILLIGRAM(S): at 13:36

## 2017-12-14 RX ADMIN — HALOPERIDOL DECANOATE 5 MILLIGRAM(S): 100 INJECTION INTRAMUSCULAR at 13:36

## 2017-12-14 RX ADMIN — RISPERIDONE 0.5 MILLIGRAM(S): 4 TABLET ORAL at 12:28

## 2017-12-14 RX ADMIN — Medication 2 MILLIGRAM(S): at 13:35

## 2017-12-14 RX ADMIN — RISPERIDONE 0.5 MILLIGRAM(S): 4 TABLET ORAL at 21:59

## 2017-12-14 NOTE — PROGRESS NOTE ADULT - PROBLEM SELECTOR PLAN 1
Patient with psychotic features of paranoia and hallucinations the last two days.   - no clear medical etiology, all w/u unrevealing  - d/w psych stable for transfer to [facility]

## 2017-12-14 NOTE — DISCHARGE NOTE ADULT - PLAN OF CARE
Improvement in symptoms Continue risperdal as prescribed.  Follow-up with your pmd as soon as possible.

## 2017-12-14 NOTE — DISCHARGE NOTE ADULT - MEDICATION SUMMARY - MEDICATIONS TO TAKE
I will START or STAY ON the medications listed below when I get home from the hospital:    risperiDONE 0.5 mg oral tablet  -- 1 tab(s) by mouth 2 times a day  -- Indication: For Psychosis

## 2017-12-14 NOTE — PROGRESS NOTE BEHAVIORAL HEALTH - NSBHCONSULTMEDAGITATION_PSY_A_CORE FT
Haldol 5mg PO/IV/IM q6h, Ativan 2mg PO/IV/IM q6h, Benadryl 50mg PO/IV/IM q6h
Haldol 5mg PO/IV/IM q6h, Ativan 2mg PO/IV/IM q6h, Benadryl 50mg PO/IV/IM q6h

## 2017-12-14 NOTE — DISCHARGE NOTE ADULT - CARE PLAN
Principal Discharge DX:	Altered mental status, unspecified altered mental status type  Goal:	Improvement in symptoms  Instructions for follow-up, activity and diet:	Continue risperdal as prescribed.  Follow-up with your pmd as soon as possible.  Secondary Diagnosis:	Psychosis  Instructions for follow-up, activity and diet:	Continue risperdal as prescribed.  Follow-up with your pmd as soon as possible.

## 2017-12-14 NOTE — PROGRESS NOTE BEHAVIORAL HEALTH - NSBHCHARTREVIEWVS_PSY_A_CORE FT
Vital Signs Last 24 Hrs  T(C): 36.4 (13 Dec 2017 08:15), Max: 37.4 (13 Dec 2017 05:39)  T(F): 97.6 (13 Dec 2017 08:15), Max: 99.3 (13 Dec 2017 05:39)  HR: 75 (13 Dec 2017 08:15) (75 - 96)  BP: 138/76 (13 Dec 2017 08:15) (137/81 - 149/90)  BP(mean): --  RR: 16 (13 Dec 2017 08:15) (16 - 18)  SpO2: 100% (13 Dec 2017 08:15) (98% - 100%)
Vital Signs Last 24 Hrs  T(C): 36.5 (14 Dec 2017 05:24), Max: 36.7 (13 Dec 2017 20:39)  T(F): 97.7 (14 Dec 2017 05:24), Max: 98.1 (13 Dec 2017 20:39)  HR: 102 (14 Dec 2017 09:30) (84 - 110)  BP: 129/88 (14 Dec 2017 09:30) (116/77 - 129/88)  BP(mean): --  RR: 17 (14 Dec 2017 09:30) (16 - 17)  SpO2: 99% (14 Dec 2017 09:30) (98% - 100%)

## 2017-12-14 NOTE — DISCHARGE NOTE ADULT - HOSPITAL COURSE
42M hx of Developmental Delay, Depression presented to [facility] ED for c/o visual hallucinations. As per patient, for the last 2-3 days the patient did not report to work because he states that he has been "seeing people" vijay him driving him to be paranoid. The patient said he went to hide, and then his parents found him with the help of the police at a [employer]. He has never had issues with paranoia or hallucinations prior to this. During the interview the patient also would periodically ask over and over again "can I go to room 513?" When asked the significance of the room, the patient would blankly stare and not give a reason. He denies any pain, visual changes, cough, diarrhea, constipation, dysuria at this time    Patient also notes, years ago he was taking pills for depression but when he stopped feeling depressed his doctor stopped prescribing him those pills. He has not seen his doctor "in a very long time."     Hospital Course: Medical work-up for cause of hallucinations unrevealing, labwork was unremarkeable, drug screen negative, ct head- normal, blood cultures negative.  Will be discharged to [facility] [place] [place] for psychiatric workup. 42M hx of Developmental Delay, Depression presented to [facility] ED for c/o visual hallucinations. As per patient, for the last 2-3 days the patient did not report to work because he states that he has been "seeing people" vijay him driving him to be paranoid. The patient said he went to hide, and then his parents found him with the help of the police at a [employer]. He has never had issues with paranoia or hallucinations prior to this. During the interview the patient also would periodically ask over and over again "can I go to room 513?" When asked the significance of the room, the patient would blankly stare and not give a reason. He denies any pain, visual changes, cough, diarrhea, constipation, dysuria at this time    Patient also notes, years ago he was taking pills for depression but when he stopped feeling depressed his doctor stopped prescribing him those pills. He has not seen his doctor "in a very long time."     Hospital Course: Medical work-up for cause of hallucinations unrevealing, labwork was unremarkeable, drug screen negative, ct head- normal, blood cultures negative.  Will be discharged to [facility] for psychiatric workup.

## 2017-12-14 NOTE — PROGRESS NOTE BEHAVIORAL HEALTH - SUMMARY
41 y/o employed M w/ intellectual disability, who was missing for two days until being found by his parents at a [employer] and then proceeding to become aggressive, was brought in to ED in handcuffs by police. In ED he reported new onset paranoia, VH, and aggression. Patient was admitted to medicine for medical workup of psychotic symptoms. Psychiatry consulted to assess and treat psychotic sx.    On initial evaluation patient was a very poor historian, struggling to recall things from even last week and at times seemed to be fabricating answers to questions. He was also somewhat disoriented (thought it was 1978) but reported being unable to work for the past few days due to paranoia. He had an episode of notable agitation during initial encounter.    Today patient endorses prominent grandiose and referential delusions that have been significantly impacting his behavior and likely resulted in his physical aggression towards his mother and step-dad. He will require inpatient psychiatric admission for safety and stabilization.      Will start Risperdal 0.5mg PO bid for psychosis. Continue w/ Haldol 5/Ativan 2/Benadryl 50 PO/IV/IM q6h for agitation. Will consider standing medication once collateral is obtained. 41 y/o employed M w/ intellectual disability, who was missing for two days until being found by his parents at a [employer] and then proceeding to become aggressive, was brought in to ED in handcuffs by police. In ED he reported new onset paranoia, VH, and aggression. Patient was admitted to medicine for medical workup of psychotic symptoms. Psychiatry consulted to assess and treat psychotic sx.    On initial evaluation patient was a very poor historian, struggling to recall things from even last week and at times seemed to be fabricating answers to questions. He was also somewhat disoriented (thought it was 1978) but reported being unable to work for the past few days due to paranoia. He had an episode of notable agitation during initial encounter.    Today patient endorses prominent grandiose and referential delusions that have been significantly impacting his behavior and likely resulted in his physical aggression towards his mother and step-dad. He will require inpatient psychiatric admission for safety and stabilization.    Will start Risperdal 0.5mg PO bid for psychosis. Continue w/ Haldol 5/Ativan 2/Benadryl 50 PO/IV/IM q6h for agitation.

## 2017-12-14 NOTE — DISCHARGE NOTE ADULT - PATIENT PORTAL LINK FT
“You can access the Follow[facility] Patient Portal, offered by [facility], by registering with the following website: [email or web address]”

## 2017-12-14 NOTE — PROGRESS NOTE ADULT - ASSESSMENT
42M hx of Developmental Delay and Depression presented to [facility] ED for c/o visual hallucinations, found to be psychotic

## 2017-12-14 NOTE — PROGRESS NOTE BEHAVIORAL HEALTH - OTHER
[place], though overall able to answer questions Unclear if pt is experiencing VH/AH at this time, pauses considerably when asked about AH and then vaguely answers no people are after me and out to get me

## 2017-12-14 NOTE — PROGRESS NOTE BEHAVIORAL HEALTH - RISK ASSESSMENT
Risk factors: Single, male, developmental disability, anxiety, impulsivity, paranoia, occupational decline, absence of outpatient follow-up, limited insight into symptoms, difficulty expressing thoughts/symptoms/emotions.     Protective factors: Denies any active suicidal ideation/intent/plan, no hx of prior attempts, no self-harm behaviors, has responsibility to family and others, identifies reasons for living, future oriented, supportive social network or family, no active substance use, no access to firearms, no legal issues, no hx of abuse and motivation to participate in care.
Risk factors: Single, male, developmental disability, anxiety, impulsivity, paranoia, occupational decline, absence of outpatient follow-up, limited insight into symptoms, difficulty expressing thoughts/symptoms/emotions.     Protective factors: Denies any active suicidal ideation/intent/plan, no hx of prior attempts, no self-harm behaviors, has responsibility to family and others, identifies reasons for living, future oriented, supportive social network or family, no active substance use, no access to firearms, no legal issues, no hx of abuse and motivation to participate in care.

## 2017-12-14 NOTE — PROGRESS NOTE BEHAVIORAL HEALTH - CASE SUMMARY
43 y/o employed M w/ intellectual disability, who was missing for two days until being found by his parents at a [employer] and then proceeding to become aggressive, was brought in to ED in handcuffs by police. In ED he reported new onset paranoia, VH, and physical aggression. Patient was admitted to medicine for medical workup of psychotic symptoms. Psychiatry consulted to assess and treat psychotic sx. Pt. seen and evaluated, AAOX3, calm and cooperative to interview.  Pt. reported that he was not able to go to work as people at work are out to get him and are talking about him. Pt. 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 Pt. also stated that people in the hospital are also after him. Patient also reported having the ability to defend himself from these people by "choking them." Pt. denies SI. Patient is currently acutely psychotic and he presented to the hospital with violent behavior due to psychosis. He is not able to function at home, not able to go to work due to his delusions. Pt. will need psychiatric admission once medically optimized for acute psychiatric decompensation. Pt. needs further psychiatric stabilization/medication management. Recommend to start Risperdal as above, monitor EKG for qtc, case discussed with Dr. Rivera,  will follow 43 y/o employed M w/ intellectual disability, who was missing for two days until being found by his parents at a [employer] and then proceeding to become aggressive, was brought in to ED in handcuffs by police. In ED he reported new onset paranoia, VH, and physical aggression. Patient was admitted to medicine for medical workup of psychotic symptoms. Psychiatry consulted to assess and treat psychotic sx. Pt. seen and evaluated, AAOX3, calm and cooperative to interview.  Pt. reported that he was not able to go to work as people at work are out to get him and are talking about him. Pt. 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 Pt. also stated that people in the hospital are also after him. Patient also reported having the ability to defend himself from these people by "choking them." Pt. denies SI. Patient is currently acutely psychotic and he presented to the hospital with violent behavior due to psychosis. He is not able to function at home, not able to go to work due to his delusions. Pt. will need psychiatric admission once medically optimized for acute psychiatric decompensation. Pt. needs further psychiatric stabilization/medication management. Pt. will be admitted to psychiatric hospital under 2pc status. Legals are in the chart. Recommend to start Risperdal as above, monitor EKG for qtc, case discussed with Dr. Rivera,  will follow 41 y/o employed M w/ intellectual disability, who was missing for two days until being found by his parents at a [employer] and then proceeding to become aggressive, was brought in to ED in handcuffs by police. In ED he reported new onset paranoia, VH, and physical aggression. Patient was admitted to medicine for medical workup of psychotic symptoms. Psychiatry consulted to assess and treat psychotic sx. Pt. seen and evaluated, AAOX3, calm and cooperative to interview.  Pt. reported that he was not able to go to work as people at work are out to get him and are talking about him. Pt. further stated that he owns his company that's why they are after him. Pt. also stated that people in the hospital are also after him. Patient also reported having the ability to defend himself from these people by "choking them." Pt. denies SI. Patient is currently acutely psychotic and he presented to the hospital with violent behavior due to psychosis. He is not able to function at home, not able to go to work due to his delusions. Pt. will need psychiatric admission once medically optimized for acute psychiatric decompensation. Pt. needs further psychiatric stabilization/medication management. Pt. will be admitted to psychiatric hospital under 2pc status. Legals are in the chart. Recommend to start Risperdal as above, monitor EKG for qtc, case discussed with Dr. Rivera,  will follow 43 y/o employed M w/ intellectual disability, who was missing for two days until being found by his parents at a [employer] and then proceeding to become aggressive, was brought in to ED in handcuffs by police. In ED he reported new onset paranoia, VH, and physical aggression. Patient was admitted to medicine for medical workup of psychotic symptoms. Psychiatry consulted to assess and treat psychotic sx. Pt. seen and evaluated, AAOX3, calm and cooperative to interview.  Pt. reported that he was not able to go to work as people at work are out to get him and are talking about him. Pt. further stated that he owns his company that's why they are after him. Pt. also stated that people in the hospital are also after him. Patient also reported having the ability to defend himself from these people by "choking them." Pt. denies SI. Patient is currently acutely psychotic and he presented to the hospital with violent behavior due to psychosis. He is not able to function at home, not able to go to work due to his delusions. Pt. will need psychiatric admission once medically optimized for acute psychiatric decompensation. Pt. needs further psychiatric stabilization/medication management. Pt. will be admitted to psychiatric hospital under 2pc status. Legals are in the chart. Recommend to start Risperdal as above, monitor EKG for qtc, case discussed with Dr. Rivera,  will follow  Sign out given to Dr. Shaun Sahu on Low 4 at 0772

## 2017-12-14 NOTE — PROGRESS NOTE ADULT - SUBJECTIVE AND OBJECTIVE BOX
Patient is a 42y old  Male who presents with a chief complaint of Visual Hallucinations (13 Dec 2017 08:50);  psychosis    SUBJECTIVE / OVERNIGHT EVENTS:  Pt calm this am.  However, he became agitated after an issue with another patient on the unit. Subsequently sleeping after meds.    MEDICATIONS  (STANDING):  influenza   Vaccine 0.5 milliLiter(s) IntraMuscular once  risperiDONE   Tablet 0.5 milliGRAM(s) Oral two times a day    MEDICATIONS  (PRN):  diphenhydrAMINE   Capsule 50 milliGRAM(s) Oral every 6 hours PRN Agitation/EPS Prophylaxis  diphenhydrAMINE   Injectable 50 milliGRAM(s) IV Push every 6 hours PRN Agitation/EPS Prophylaxis  diphenhydrAMINE   Injectable 50 milliGRAM(s) IntraMuscular every 6 hours PRN Agitation/EPS Prophylaxis  haloperidol     Tablet 5 milliGRAM(s) Oral every 6 hours PRN Agitation  haloperidol    Injectable 5 milliGRAM(s) IntraMuscular every 6 hours PRN Agitation  LORazepam     Tablet 2 milliGRAM(s) Oral every 6 hours PRN Agitation  LORazepam   Injectable 2 milliGRAM(s) IntraMuscular every 6 hours PRN Agitation  LORazepam   Injectable 2 milliGRAM(s) IV Push every 6 hours PRN Agitation    Vital Signs Last 24 Hrs  T(C): 36.5 (14 Dec 2017 05:24), Max: 36.7 (13 Dec 2017 20:39)  T(F): 97.7 (14 Dec 2017 05:24), Max: 98.1 (13 Dec 2017 20:39)  HR: 102 (14 Dec 2017 09:30) (84 - 110)  BP: 129/88 (14 Dec 2017 09:30) (116/78 - 129/88)  RR: 17 (14 Dec 2017 09:30) (16 - 17)  SpO2: 99% (14 Dec 2017 09:30) (98% - 100%)    PHYSICAL EXAM:  GENERAL: NAD, well-developed  HEAD:  Atraumatic, Normocephalic  EYES: EOMI, PERRLA, conjunctiva and sclera clear  NECK: Supple, No JVD  CHEST/LUNG: Clear to auscultation bilaterally; No wheeze  HEART: Regular rate and rhythm; No murmurs, rubs, or gallops  ABDOMEN: Soft, Nontender, Nondistended; Bowel sounds present  EXTREMITIES:  2+ Peripheral Pulses, No clubbing, cyanosis, or edema  PSYCH: AAOx3, initially calm then became agitated  NEUROLOGY: non-focal;  L eyelid droop (old per pt)  SKIN: No rashes or lesions    LABS:             14.3   6.53  )-----------( 285      ( 14 Dec 2017 12:15 )             42.3     140  |  99  |  12  ----------------------------<  89  3.7   |  27  |  1.05    Ca    9.8      14 Dec 2017 12:15    TPro  8.0  /  Alb  4.6  /  TBili  1.8<H>  /  DBili  x   /  AST  23  /  ALT  17  /  AlkPhos  89  12-14    CARDIAC MARKERS ( 14 Dec 2017 12:15 )  x     / x     / 679 u/L / x     / x        Urinalysis Basic - ( 13 Dec 2017 07:12 )  Color: YELLOW / Appearance: CLEAR / SG: > 1.040 / pH: 6.0  Gluc: NEGATIVE / Ketone: LARGE  / Bili: NEGATIVE / Urobili: NORMAL mg/dL   Blood: NEGATIVE / Protein: 30 mg/dL / Nitrite: NEGATIVE   Leuk Esterase: NEGATIVE / RBC: 0-2 / WBC 0-2   Sq Epi: x / Non Sq Epi: x / Bacteria: x    RADIOLOGY & ADDITIONAL TESTS:    Imaging Personally Reviewed:    Consultant(s) Notes Reviewed:      Care Discussed with Consultants/Other Providers:  UZAIR, RN, SW, CM;  psych re behavior

## 2017-12-14 NOTE — PROGRESS NOTE BEHAVIORAL HEALTH - NSBHFUPINTERVALHXFT_PSY_A_CORE
No acute events overnight, pt did not receive any PRN medications.. This morning patient is noted to be calm and cooperative. He initially reports no concerns but then reports that he is wary of his roommate. He reports "keeping an eye on him" because the patient is unsure of the patient's "vibe." Patient reports that PTA he had been having experiences of feeling that people with "bad vibes" were affecting him. He reports that they cause him to feel shaky, start sweating, and to feel febrile. Pt specifically recalls walking down the street a few days PTA and seeing a lady who was giving off a "bad vibe" resulting in patient circling around the block in attempt to "lose her." Pt cannot report what he would have done if his path continued to coincide with her's. 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 He reports that he knows this as "the other workers have been talking about it." He cannot report how he knows what his coworkers are speaking about. Patient also reports prominent referential delusions which he describes as "telekinesis," manifested by him unloading a box and then it falling on the floor while those that other people unload do not fall on the floor.    Patient recalls episode of agitation yesterday AM. States that at the time he was worried about being manipulated by others in the hospital. Specifically by people in "room 513."    Patient has difficulty describing a timeline of these symptoms other than to say that the episode of him trying to "lose" the lady walking on the same street of him is a distinct change that occurred this week. He reports having been very distressed by this encounter. He cannot say what he would do if he were to encounter someone whom he felt was continuing to follow him. He denies SI/HI at this time. No acute events overnight, pt did not receive any PRN medications.. This morning patient is noted to be calm and cooperative. He initially reports no concerns but then reports that he is wary of his roommate. He reports "keeping an eye on him" because the patient is unsure of the patient's "vibe." Patient reports that PTA he had been having experiences of feeling that people with "bad vibes" were affecting him. He reports that they cause him to feel shaky, start sweating, and to feel febrile. Pt specifically recalls walking down the street a few days PTA and seeing a lady who was giving off a "bad vibe" resulting in patient circling around the block in attempt to "lose her." Pt cannot report what he would have done if his path continued to coincide with her's. 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 He reports that he knows this as "the other workers have been talking about it." He cannot report how he knows what his coworkers are speaking about. Patient also reports prominent referential delusions which he describes as "telekinesis," manifested by him unloading a box and then it falling on the floor while those that other people unload do not fall on the floor.    Patient recalls episode of agitation yesterday AM. States that at the time he was worried about being manipulated by others in the hospital. Specifically by people in "room 513."    Patient has difficulty describing a timeline of these symptoms other than to say that the episode of him trying to "lose" the lady walking on the same street of him is a distinct change that occurred this week. He reports having been very distressed by this encounter. He cannot say what he would do if he were to encounter someone whom he felt was continuing to follow him. He denies SI/HI at this time.    Team spoke with pt.'s mother today and discussed about the plan that pt. needs psychiatric admission for worsening psychosis. She agreed with the plan. No acute events overnight, pt did not receive any PRN medications.. This morning patient is noted to be calm and cooperative. He initially reports no concerns but then reports that he is wary of his roommate. He reports "keeping an eye on him" because the patient is unsure of the patient's "vibe." Patient reports that PTA he had been having experiences of feeling that people with "bad vibes" were effecting him. He reports that they cause him to feel shaky, start sweating, and to feel febrile. Pt specifically recalls walking down the street a few days PTA and seeing a lady who was giving off a "bad vibe" resulting in patient circling around the block in attempt to "lose her." Pt cannot report what he would have done if his path continued to coincide with her's. 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 He reports that he knows this as "the other workers have been talking about it." He cannot report how he knows what his coworkers are speaking about. Patient also reports prominent referential delusions which he describes as "telekinesis," manifested by him unloading a box and then it falling on the floor while those that other people unload do not fall on the floor.    Patient recalls episode of agitation yesterday AM. States that at the time he was worried about being manipulated by others in the hospital. Specifically by people in "room 513."    Patient has difficulty describing a timeline of these symptoms other than to say that the episode of him trying to "lose" the lady walking on the same street of him is a distinct change that occurred this week. He reports having been very distressed by this encounter. He cannot say what he would do if he were to encounter someone whom he felt was continuing to follow him. He denies SI/HI at this time.    Team spoke with pt.'s mother today and discussed about the plan that pt. needs psychiatric admission for worsening psychosis. She agreed with the plan.

## 2017-12-14 NOTE — PROGRESS NOTE ADULT - ATTENDING COMMENTS
Pt stable for transfer to [facility].    Spent 45 minutes counseling and coordinating discharge care.

## 2017-12-14 NOTE — PROGRESS NOTE BEHAVIORAL HEALTH - NSBHCONSFOLLOWNEEDS_PSY_A_CORE
Patient needs further psychiatric safety assessment prior to discharge
Patient needs further psychiatric safety assessment prior to discharge

## 2017-12-15 LAB — T PALLIDUM AB TITR SER: NEGATIVE — SIGNIFICANT CHANGE UP

## 2017-12-15 PROCEDURE — 99223 1ST HOSP IP/OBS HIGH 75: CPT

## 2017-12-15 RX ADMIN — RISPERIDONE 0.5 MILLIGRAM(S): 4 TABLET ORAL at 21:18

## 2017-12-15 RX ADMIN — RISPERIDONE 0.5 MILLIGRAM(S): 4 TABLET ORAL at 08:32

## 2017-12-15 NOTE — CONSULT NOTE ADULT - SUBJECTIVE AND OBJECTIVE BOX
HPI: Pt is 42M admitted to [facility] 12/13/17 for new onset of psychosis.  Medical workup for cause of psychosis was unrevealing and he was transferred to [facility] 12/14/17 for further management of psychosis.    PAST MEDICAL & SURGICAL HISTORY:  Developmental delay  Depression  No significant past surgical history      Review of Systems:   CONSTITUTIONAL: No fever, weight loss, or fatigue  EYES: No eye pain, visual disturbances, or discharge  ENMT:  No difficulty hearing, tinnitus, vertigo; No sinus or throat pain  NECK: No pain or stiffness  RESPIRATORY: No cough, wheezing, chills or hemoptysis; No shortness of breath  CARDIOVASCULAR: No chest pain, palpitations, dizziness, or leg swelling  GASTROINTESTINAL: No abdominal or epigastric pain. No nausea, vomiting, or hematemesis; No diarrhea or constipation. No melena or hematochezia.  GENITOURINARY: No dysuria, frequency, hematuria, or incontinence  NEUROLOGICAL: No headaches, memory loss, loss of strength, numbness, or tremors  SKIN: No itching, burning, rashes, or lesions   LYMPH NODES: No enlarged glands  ENDOCRINE: No heat or cold intolerance; No hair loss  MUSCULOSKELETAL: No joint pain or swelling; No muscle, back, or extremity pain  HEME/LYMPH: No easy bruising, or bleeding gums  ALLERY AND IMMUNOLOGIC: No hives or eczema    Allergies    No Known Allergies      Social History: denies etoh/tob/idu    FAMILY HISTORY:  No pertinent family history in first degree relatives      MEDICATIONS  (STANDING):  diphenhydrAMINE   Injectable 50 milliGRAM(s) IntraMuscular once  haloperidol    Injectable 5 milliGRAM(s) IntraMuscular once  LORazepam   Injectable 2 milliGRAM(s) IntraMuscular once  risperiDONE   Tablet 0.5 milliGRAM(s) Oral two times a day    MEDICATIONS  (PRN):  diphenhydrAMINE   Capsule 50 milliGRAM(s) Oral every 6 hours PRN EPS ppx  haloperidol     Tablet 5 milliGRAM(s) Oral every 6 hours PRN agitation  LORazepam     Tablet 2 milliGRAM(s) Oral every 6 hours PRN Agitation      Vital Signs Last 24 Hrs  T(C): 36.9 (15 Dec 2017 08:33), Max: 36.9 (15 Dec 2017 08:33)  T(F): 98.4 (15 Dec 2017 08:33), Max: 98.4 (15 Dec 2017 08:33)  HR: 105 (14 Dec 2017 19:47) (83 - 105)  BP: 110/73 (14 Dec 2017 19:47) (105/72 - 110/73)  BP(mean): --  RR: 17 (14 Dec 2017 17:11) (17 - 17)  SpO2: 98% (14 Dec 2017 17:11) (98% - 98%)  CAPILLARY BLOOD GLUCOSE            PHYSICAL EXAM:  GENERAL: NAD, well-developed  HEAD:  Atraumatic, Normocephalic  EYES: EOMI, PERRLA, conjunctiva and sclera clear  NECK: Supple, No JVD  CHEST/LUNG: Clear to auscultation bilaterally; No wheeze  HEART: Regular rate and rhythm; No murmurs, rubs, or gallops  ABDOMEN: Soft, Nontender, Nondistended; Bowel sounds present  EXTREMITIES:  2+ Peripheral Pulses, No clubbing, cyanosis, or edema  PSYCH: AAOx3  NEUROLOGY: non-focal  SKIN: No rashes or lesions    LABS:                        14.3   6.53  )-----------( 285      ( 14 Dec 2017 12:15 )             42.3     12-14    140  |  99  |  12  ----------------------------<  89  3.7   |  27  |  1.05    Ca    9.8      14 Dec 2017 12:15    TPro  8.0  /  Alb  4.6  /  TBili  1.8<H>  /  DBili  x   /  AST  23  /  ALT  17  /  AlkPhos  89  12-14      CARDIAC MARKERS ( 14 Dec 2017 12:15 )  x     / x     / 679 u/L / x     / x        Syphilis Screen (12.14.17 @ 12:15)    Treponema Pallidum Antibody Interpretation: Negative  Vitamin B12, Serum (12.14.17 @ 12:15)    Vitamin B12, Serum: 630 pg/mL  Creatine Kinase, Serum (12.14.17 @ 12:15)    Creatine Kinase, Serum: 679u/L    EKG(personally reviewed):< from: 12 Lead ECG (12.13.17 @ 01:40) >    Ventricular Rate 79 BPM    Atrial Rate 79 BPM    P-R Interval 150 ms    QRS Duration 78 ms    Q-T Interval 368 ms    QTC Calculation(Bezet) 421 ms    P Axis 69 degrees    R Axis 68 degrees    T Axis 49 degrees    Diagnosis Line Normal sinus rhythm  RSR' or QR pattern in V1 suggests right ventricular conduction delay  Borderline ECG    < end of copied text >      RADIOLOGY & ADDITIONAL TESTS:    Imaging Personally Reviewed:  < from: CT Angio Head w/ IV Cont (12.13.17 @ 02:20) >    EXAM:  CT ANGIO NECK (W)AW IC      EXAM:  CT ANGIO BRAIN (W)AW IC        PROCEDURE DATE:  Dec 13 2017         INTERPRETATION:  Study: CTA head and Neck    CLINICAL HISTORY:  Altered mental status, psychosis, left-sided neck   pain.? Trauma.    TECHNIQUE: Study: CTA head and Neck    CLINICAL HISTORY:      TECHNIQUE:  Noncontrast CT of the brain was obtained. CTA examination of   the head and neck performed by obtaining helical axial images on a   multi-detector CT unit during dynamic intravenousadministration of   iodinated contrast in the arterial phase. The image data was acquired   utilizing submillimeter partitions and subsequently reconstructed into   3-dimensional maximum intensity projections and/or 3-D surface renditions   in multiple planes using a separate work station. Both the axial source   and reconstructed images were reviewed. 90 mls of Omnipaque 350 was   administered intravenously without complication and 10 mls were discarded.    COMPARISON: None available.    FINDINGS:    Noncontrast head CT:  There is no acute intracranial hemorrhage, mass effect or evidence of   acute territorial infarct.    The ventricles, sulci and cisternal spaces are stable in size and   configuration and unremarkable for age. There is no midline shift or   abnormal extra-axial fluid collection.    The calvarium is intact. There is no suspicious osteoblastic or lytic   calvarial lesion. There is mild mucosal thickening in the ethmoid air   cells. Remaining visualized paranasal sinuses and mastoid air cells are   clear.    CTA neck:  There is a normal configuration to the great vessels as they arise in   aortic arch. The arch and origins of the great vessels are patent.    Both common carotid arteries take a normal course and caliberthroughout   the cervical region. There is slightly higher bifurcation into the   internal and external carotid arteries on the left. The visualized   portions of the external carotid arteries and the cervical internal   carotid arteries are widely patent and normal in caliber. No evidence of   focal occlusion, hemodynamically significant stenosis or dissection of   the anterior carotid circulation within the neck.    Origins of both vertebral arteries are identified and widely patent. The   right vertebral artery is dominant. Otherwise, the vertebral arteries   take a normal course and caliber throughout the cervical region. No focal   occlusion, hemodynamically significant stenosis or dissection is seen in   the posterior vertebral circulation within the neck.    There is no evidence of vascular malformation in the neck. There is no   abnormal AV shunting.    The visualized soft tissues of the neck are unremarkable. There is no   significant adenopathy in the neck. There are no acute osseous   abnormalities.      CTA brain:  The cervical, petrous, lacerum, cavernous, clinoid and supraclinoid   segments of both internal carotid arteries are widely patent and of   normal course and caliber throughout the skull base. Scattered   calcifications involve both cavernous segments without significant   luminal stenosis. There is normal bifurcation into the A1 and M1 segments   of both the anterior middle cerebral arteries, respectively. The middle   and anterior cerebral arteries demonstrate symmetric arborization and   patency without focal occlusion, hemodynamically significant stenosis or   aneurysm. Anterior communicating artery is unremarkable.    The right intradural vertebral artery remains dominant. Both posterior   inferior cerebellar arteries are identified and unremarkable. The basilar   artery and its tributaries, including the posterior cerebral arteries are   widely patent and normal in caliber. Both posterior communicating   arteries are identified and unremarkable. No focal occlusion,   hemodynamically significant stenosis or aneurysm is seen in the posterior   vertebral basilar circulation.    The major dural venous sinuses and peripheral venous tributaries enhance   appropriately for phase of contrast administration. There is no abnormal   intracranial enhancement. There is no evidence of a vascular malformation.      IMPRESSION:  Noncontrast head CT:   No acute intracranial hemorrhage, mass effect or evidence of acute   territorial infarct.    CTA head and neck: Unremarkable examinations. No major vessel occlusion,   hemodynamically significant stenosis, dissection or aneurysm in the head   or neck.    Evaluation for stenosis in the neck is in keeping with the NASCET   criteria.      NANDA MICHEL M.D., RADIOLOGIST  This document has been electronically signed. Dec 13 2017  4:08AM      < end of copied text >    Consultant(s) Notes Reviewed:  psych HPI: Pt is 42M admitted to [facility] 12/13/17 for new onset of psychosis.  Medical workup for cause of psychosis was unrevealing and he was transferred to [facility] 12/14/17 for further management of psychosis.    PAST MEDICAL & SURGICAL HISTORY:  Developmental delay  Depression  No significant past surgical history      Review of Systems:   limited ROS due to mental state; denies physical complaints    Allergies    No Known Allergies      Social History: denies etoh/tob/idu    FAMILY HISTORY:  No pertinent family history in first degree relatives      MEDICATIONS  (STANDING):  diphenhydrAMINE   Injectable 50 milliGRAM(s) IntraMuscular once  haloperidol    Injectable 5 milliGRAM(s) IntraMuscular once  LORazepam   Injectable 2 milliGRAM(s) IntraMuscular once  risperiDONE   Tablet 0.5 milliGRAM(s) Oral two times a day    MEDICATIONS  (PRN):  diphenhydrAMINE   Capsule 50 milliGRAM(s) Oral every 6 hours PRN EPS ppx  haloperidol     Tablet 5 milliGRAM(s) Oral every 6 hours PRN agitation  LORazepam     Tablet 2 milliGRAM(s) Oral every 6 hours PRN Agitation      Vital Signs Last 24 Hrs  T(C): 36.9 (15 Dec 2017 08:33), Max: 36.9 (15 Dec 2017 08:33)  T(F): 98.4 (15 Dec 2017 08:33), Max: 98.4 (15 Dec 2017 08:33)  HR: 105 (14 Dec 2017 19:47) (83 - 105)  BP: 110/73 (14 Dec 2017 19:47) (105/72 - 110/73)  BP(mean): --  RR: 17 (14 Dec 2017 17:11) (17 - 17)  SpO2: 98% (14 Dec 2017 17:11) (98% - 98%)  CAPILLARY BLOOD GLUCOSE            PHYSICAL EXAM:  GENERAL: NAD, well-developed  HEAD:  Atraumatic, Normocephalic  EYES: EOMI, PERRLA, conjunctiva and sclera clear  NECK: Supple, No JVD  CHEST/LUNG: Clear to auscultation bilaterally; No wheeze  HEART: Regular rate and rhythm; No murmurs, rubs, or gallops  ABDOMEN: Soft, Nontender, Nondistended; Bowel sounds present  EXTREMITIES:  2+ Peripheral Pulses, No clubbing, cyanosis, or edema  PSYCH: AAOx3  NEUROLOGY: non-focal  SKIN: No rashes or lesions    LABS:                        14.3   6.53  )-----------( 285      ( 14 Dec 2017 12:15 )             42.3     12-14    140  |  99  |  12  ----------------------------<  89  3.7   |  27  |  1.05    Ca    9.8      14 Dec 2017 12:15    TPro  8.0  /  Alb  4.6  /  TBili  1.8<H>  /  DBili  x   /  AST  23  /  ALT  17  /  AlkPhos  89  12-14      CARDIAC MARKERS ( 14 Dec 2017 12:15 )  x     / x     / 679 u/L / x     / x        Syphilis Screen (12.14.17 @ 12:15)    Treponema Pallidum Antibody Interpretation: Negative  Vitamin B12, Serum (12.14.17 @ 12:15)    Vitamin B12, Serum: 630 pg/mL  Creatine Kinase, Serum (12.14.17 @ 12:15)    Creatine Kinase, Serum: 679u/L    EKG(personally reviewed):< from: 12 Lead ECG (12.13.17 @ 01:40) >    Ventricular Rate 79 BPM    Atrial Rate 79 BPM    P-R Interval 150 ms    QRS Duration 78 ms    Q-T Interval 368 ms    QTC Calculation(Bezet) 421 ms    P Axis 69 degrees    R Axis 68 degrees    T Axis 49 degrees    Diagnosis Line Normal sinus rhythm  RSR' or QR pattern in V1 suggests right ventricular conduction delay  Borderline ECG    < end of copied text >      RADIOLOGY & ADDITIONAL TESTS:    Imaging Personally Reviewed:  < from: CT Angio Head w/ IV Cont (12.13.17 @ 02:20) >    EXAM:  CT ANGIO NECK (W)AW IC      EXAM:  CT ANGIO BRAIN (W)AW IC        PROCEDURE DATE:  Dec 13 2017         INTERPRETATION:  Study: CTA head and Neck    CLINICAL HISTORY:  Altered mental status, psychosis, left-sided neck   pain.? Trauma.    TECHNIQUE: Study: CTA head and Neck    CLINICAL HISTORY:      TECHNIQUE:  Noncontrast CT of the brain was obtained. CTA examination of   the head and neck performed by obtaining helical axial images on a   multi-detector CT unit during dynamic intravenousadministration of   iodinated contrast in the arterial phase. The image data was acquired   utilizing submillimeter partitions and subsequently reconstructed into   3-dimensional maximum intensity projections and/or 3-D surface renditions   in multiple planes using a separate work station. Both the axial source   and reconstructed images were reviewed. 90 mls of Omnipaque 350 was   administered intravenously without complication and 10 mls were discarded.    COMPARISON: None available.    FINDINGS:    Noncontrast head CT:  There is no acute intracranial hemorrhage, mass effect or evidence of   acute territorial infarct.    The ventricles, sulci and cisternal spaces are stable in size and   configuration and unremarkable for age. There is no midline shift or   abnormal extra-axial fluid collection.    The calvarium is intact. There is no suspicious osteoblastic or lytic   calvarial lesion. There is mild mucosal thickening in the ethmoid air   cells. Remaining visualized paranasal sinuses and mastoid air cells are   clear.    CTA neck:  There is a normal configuration to the great vessels as they arise in   aortic arch. The arch and origins of the great vessels are patent.    Both common carotid arteries take a normal course and caliberthroughout   the cervical region. There is slightly higher bifurcation into the   internal and external carotid arteries on the left. The visualized   portions of the external carotid arteries and the cervical internal   carotid arteries are widely patent and normal in caliber. No evidence of   focal occlusion, hemodynamically significant stenosis or dissection of   the anterior carotid circulation within the neck.    Origins of both vertebral arteries are identified and widely patent. The   right vertebral artery is dominant. Otherwise, the vertebral arteries   take a normal course and caliber throughout the cervical region. No focal   occlusion, hemodynamically significant stenosis or dissection is seen in   the posterior vertebral circulation within the neck.    There is no evidence of vascular malformation in the neck. There is no   abnormal AV shunting.    The visualized soft tissues of the neck are unremarkable. There is no   significant adenopathy in the neck. There are no acute osseous   abnormalities.      CTA brain:  The cervical, petrous, lacerum, cavernous, clinoid and supraclinoid   segments of both internal carotid arteries are widely patent and of   normal course and caliber throughout the skull base. Scattered   calcifications involve both cavernous segments without significant   luminal stenosis. There is normal bifurcation into the A1 and M1 segments   of both the anterior middle cerebral arteries, respectively. The middle   and anterior cerebral arteries demonstrate symmetric arborization and   patency without focal occlusion, hemodynamically significant stenosis or   aneurysm. Anterior communicating artery is unremarkable.    The right intradural vertebral artery remains dominant. Both posterior   inferior cerebellar arteries are identified and unremarkable. The basilar   artery and its tributaries, including the posterior cerebral arteries are   widely patent and normal in caliber. Both posterior communicating   arteries are identified and unremarkable. No focal occlusion,   hemodynamically significant stenosis or aneurysm is seen in the posterior   vertebral basilar circulation.    The major dural venous sinuses and peripheral venous tributaries enhance   appropriately for phase of contrast administration. There is no abnormal   intracranial enhancement. There is no evidence of a vascular malformation.      IMPRESSION:  Noncontrast head CT:   No acute intracranial hemorrhage, mass effect or evidence of acute   territorial infarct.    CTA head and neck: Unremarkable examinations. No major vessel occlusion,   hemodynamically significant stenosis, dissection or aneurysm in the head   or neck.    Evaluation for stenosis in the neck is in keeping with the NASCET   criteria.      NANDA MICHEL M.D., RADIOLOGIST  This document has been electronically signed. Dec 13 2017  4:08AM      < end of copied text >    Consultant(s) Notes Reviewed:  psych

## 2017-12-15 NOTE — CONSULT NOTE ADULT - ASSESSMENT
42M developmental delay admitted to [facility] 12/13/17 with new onset psychosis, medical workup unrevealing for secondary cause; transferred to [facility] 12/14/17 for further management of psychosis.    Plan:  CPK elevation: Mild, no renal compromise, encourage PO fluids.  No further w/u required at this time.    Psychosis: management per primary team 42M developmental delay admitted to [facility] 12/13/17 with new onset psychosis, medical workup unrevealing for secondary cause; transferred to [facility] 12/14/17 for further management of psychosis.    Plan:  CPK elevation: Mild, no renal compromise, encourage PO fluids.  No further w/u required at this time.    Elevated total bilirubin: in absence of any other abnormal liver tests suggests TB is du eto benign Gilbert syndrome. No further w/u required at this time.    Psychosis: management per primary team

## 2017-12-16 PROCEDURE — 90792 PSYCH DIAG EVAL W/MED SRVCS: CPT

## 2017-12-16 PROCEDURE — 99233 SBSQ HOSP IP/OBS HIGH 50: CPT

## 2017-12-16 RX ADMIN — HALOPERIDOL DECANOATE 5 MILLIGRAM(S): 100 INJECTION INTRAMUSCULAR at 17:49

## 2017-12-16 RX ADMIN — RISPERIDONE 0.5 MILLIGRAM(S): 4 TABLET ORAL at 08:24

## 2017-12-16 RX ADMIN — Medication 50 MILLIGRAM(S): at 17:49

## 2017-12-16 RX ADMIN — RISPERIDONE 0.5 MILLIGRAM(S): 4 TABLET ORAL at 22:23

## 2017-12-16 RX ADMIN — Medication 2 MILLIGRAM(S): at 17:49

## 2017-12-17 RX ADMIN — Medication 50 MILLIGRAM(S): at 20:10

## 2017-12-17 RX ADMIN — HALOPERIDOL DECANOATE 5 MILLIGRAM(S): 100 INJECTION INTRAMUSCULAR at 20:10

## 2017-12-17 RX ADMIN — Medication 2 MILLIGRAM(S): at 20:10

## 2017-12-17 RX ADMIN — RISPERIDONE 0.5 MILLIGRAM(S): 4 TABLET ORAL at 08:40

## 2017-12-17 RX ADMIN — RISPERIDONE 0.5 MILLIGRAM(S): 4 TABLET ORAL at 20:49

## 2017-12-18 LAB
BACTERIA BLD CULT: SIGNIFICANT CHANGE UP
BACTERIA BLD CULT: SIGNIFICANT CHANGE UP

## 2017-12-18 PROCEDURE — 99232 SBSQ HOSP IP/OBS MODERATE 35: CPT

## 2017-12-18 RX ADMIN — RISPERIDONE 0.5 MILLIGRAM(S): 4 TABLET ORAL at 08:47

## 2017-12-18 RX ADMIN — RISPERIDONE 0.5 MILLIGRAM(S): 4 TABLET ORAL at 20:39

## 2017-12-19 PROCEDURE — 99232 SBSQ HOSP IP/OBS MODERATE 35: CPT

## 2017-12-19 RX ADMIN — RISPERIDONE 0.5 MILLIGRAM(S): 4 TABLET ORAL at 20:50

## 2017-12-19 RX ADMIN — RISPERIDONE 0.5 MILLIGRAM(S): 4 TABLET ORAL at 08:44

## 2017-12-20 PROCEDURE — 99232 SBSQ HOSP IP/OBS MODERATE 35: CPT

## 2017-12-20 RX ORDER — RISPERIDONE 4 MG/1
1 TABLET ORAL AT BEDTIME
Qty: 0 | Refills: 0 | Status: DISCONTINUED | OUTPATIENT
Start: 2017-12-20 | End: 2017-12-20

## 2017-12-20 RX ORDER — RISPERIDONE 4 MG/1
1 TABLET ORAL AT BEDTIME
Qty: 0 | Refills: 0 | Status: DISCONTINUED | OUTPATIENT
Start: 2017-12-20 | End: 2017-12-22

## 2017-12-20 RX ADMIN — RISPERIDONE 0.5 MILLIGRAM(S): 4 TABLET ORAL at 10:49

## 2017-12-20 RX ADMIN — RISPERIDONE 1 MILLIGRAM(S): 4 TABLET ORAL at 21:24

## 2017-12-21 PROCEDURE — 99232 SBSQ HOSP IP/OBS MODERATE 35: CPT

## 2017-12-21 PROCEDURE — 99223 1ST HOSP IP/OBS HIGH 75: CPT | Mod: GC

## 2017-12-21 RX ADMIN — RISPERIDONE 1 MILLIGRAM(S): 4 TABLET ORAL at 20:38

## 2017-12-22 ENCOUNTER — TRANSCRIPTION ENCOUNTER (OUTPATIENT)
Age: 42
End: 2017-12-22

## 2017-12-22 VITALS — TEMPERATURE: 97 F

## 2017-12-22 LAB
ALBUMIN SERPL ELPH-MCNC: 4.2 G/DL — SIGNIFICANT CHANGE UP (ref 3.3–5)
ALP SERPL-CCNC: 75 U/L — SIGNIFICANT CHANGE UP (ref 40–120)
ALT FLD-CCNC: 35 U/L — SIGNIFICANT CHANGE UP (ref 4–41)
APPEARANCE UR: CLEAR — SIGNIFICANT CHANGE UP
AST SERPL-CCNC: 26 U/L — SIGNIFICANT CHANGE UP (ref 4–40)
BACTERIA # UR AUTO: SIGNIFICANT CHANGE UP
BASOPHILS # BLD AUTO: 0.05 K/UL — SIGNIFICANT CHANGE UP (ref 0–0.2)
BASOPHILS NFR BLD AUTO: 0.7 % — SIGNIFICANT CHANGE UP (ref 0–2)
BILIRUB SERPL-MCNC: 0.5 MG/DL — SIGNIFICANT CHANGE UP (ref 0.2–1.2)
BILIRUB UR-MCNC: NEGATIVE — SIGNIFICANT CHANGE UP
BLOOD UR QL VISUAL: NEGATIVE — SIGNIFICANT CHANGE UP
BUN SERPL-MCNC: 13 MG/DL — SIGNIFICANT CHANGE UP (ref 7–23)
CALCIUM SERPL-MCNC: 9.2 MG/DL — SIGNIFICANT CHANGE UP (ref 8.4–10.5)
CHLORIDE SERPL-SCNC: 99 MMOL/L — SIGNIFICANT CHANGE UP (ref 98–107)
CK SERPL-CCNC: 195 U/L — SIGNIFICANT CHANGE UP (ref 30–200)
CO2 SERPL-SCNC: 24 MMOL/L — SIGNIFICANT CHANGE UP (ref 22–31)
COLOR SPEC: YELLOW — SIGNIFICANT CHANGE UP
CREAT SERPL-MCNC: 0.98 MG/DL — SIGNIFICANT CHANGE UP (ref 0.5–1.3)
EOSINOPHIL # BLD AUTO: 0.65 K/UL — HIGH (ref 0–0.5)
EOSINOPHIL NFR BLD AUTO: 8.5 % — HIGH (ref 0–6)
GLUCOSE SERPL-MCNC: 102 MG/DL — HIGH (ref 70–99)
GLUCOSE UR-MCNC: NEGATIVE — SIGNIFICANT CHANGE UP
HBA1C BLD-MCNC: 5.8 % — HIGH (ref 4–5.6)
HCT VFR BLD CALC: 39.3 % — SIGNIFICANT CHANGE UP (ref 39–50)
HGB BLD-MCNC: 13.5 G/DL — SIGNIFICANT CHANGE UP (ref 13–17)
IMM GRANULOCYTES # BLD AUTO: 0.01 # — SIGNIFICANT CHANGE UP
IMM GRANULOCYTES NFR BLD AUTO: 0.1 % — SIGNIFICANT CHANGE UP (ref 0–1.5)
KETONES UR-MCNC: NEGATIVE — SIGNIFICANT CHANGE UP
LEUKOCYTE ESTERASE UR-ACNC: NEGATIVE — SIGNIFICANT CHANGE UP
LYMPHOCYTES # BLD AUTO: 2.85 K/UL — SIGNIFICANT CHANGE UP (ref 1–3.3)
LYMPHOCYTES # BLD AUTO: 37.1 % — SIGNIFICANT CHANGE UP (ref 13–44)
MCHC RBC-ENTMCNC: 30.3 PG — SIGNIFICANT CHANGE UP (ref 27–34)
MCHC RBC-ENTMCNC: 34.4 % — SIGNIFICANT CHANGE UP (ref 32–36)
MCV RBC AUTO: 88.3 FL — SIGNIFICANT CHANGE UP (ref 80–100)
MONOCYTES # BLD AUTO: 1.08 K/UL — HIGH (ref 0–0.9)
MONOCYTES NFR BLD AUTO: 14 % — SIGNIFICANT CHANGE UP (ref 2–14)
MUCOUS THREADS # UR AUTO: SIGNIFICANT CHANGE UP
NEUTROPHILS # BLD AUTO: 3.05 K/UL — SIGNIFICANT CHANGE UP (ref 1.8–7.4)
NEUTROPHILS NFR BLD AUTO: 39.6 % — LOW (ref 43–77)
NITRITE UR-MCNC: NEGATIVE — SIGNIFICANT CHANGE UP
NRBC # FLD: 0 — SIGNIFICANT CHANGE UP
PH UR: 6 — SIGNIFICANT CHANGE UP (ref 4.6–8)
PLATELET # BLD AUTO: 289 K/UL — SIGNIFICANT CHANGE UP (ref 150–400)
PMV BLD: 10.4 FL — SIGNIFICANT CHANGE UP (ref 7–13)
POTASSIUM SERPL-MCNC: 4.1 MMOL/L — SIGNIFICANT CHANGE UP (ref 3.5–5.3)
POTASSIUM SERPL-SCNC: 4.1 MMOL/L — SIGNIFICANT CHANGE UP (ref 3.5–5.3)
PROT SERPL-MCNC: 7.4 G/DL — SIGNIFICANT CHANGE UP (ref 6–8.3)
PROT UR-MCNC: 10 MG/DL — SIGNIFICANT CHANGE UP
RBC # BLD: 4.45 M/UL — SIGNIFICANT CHANGE UP (ref 4.2–5.8)
RBC # FLD: 12.5 % — SIGNIFICANT CHANGE UP (ref 10.3–14.5)
RBC CASTS # UR COMP ASSIST: SIGNIFICANT CHANGE UP (ref 0–?)
SODIUM SERPL-SCNC: 139 MMOL/L — SIGNIFICANT CHANGE UP (ref 135–145)
SP GR SPEC: 1.03 — SIGNIFICANT CHANGE UP (ref 1–1.04)
UROBILINOGEN FLD QL: NORMAL MG/DL — SIGNIFICANT CHANGE UP
WBC # BLD: 7.69 K/UL — SIGNIFICANT CHANGE UP (ref 3.8–10.5)
WBC # FLD AUTO: 7.69 K/UL — SIGNIFICANT CHANGE UP (ref 3.8–10.5)
WBC UR QL: SIGNIFICANT CHANGE UP (ref 0–?)

## 2017-12-22 PROCEDURE — 90832 PSYTX W PT 30 MINUTES: CPT

## 2017-12-22 PROCEDURE — 99238 HOSP IP/OBS DSCHRG MGMT 30/<: CPT

## 2017-12-22 RX ORDER — RISPERIDONE 4 MG/1
1 TABLET ORAL
Qty: 90 | Refills: 0 | OUTPATIENT
Start: 2017-12-22 | End: 2018-03-21

## 2017-12-24 ENCOUNTER — EMERGENCY (EMERGENCY)
Facility: HOSPITAL | Age: 42
LOS: 1 days | Discharge: HOME | End: 2017-12-24
Attending: EMERGENCY MEDICINE | Admitting: EMERGENCY MEDICINE
Payer: MEDICAID

## 2017-12-24 VITALS
RESPIRATION RATE: 15 BRPM | DIASTOLIC BLOOD PRESSURE: 74 MMHG | OXYGEN SATURATION: 99 % | TEMPERATURE: 98 F | SYSTOLIC BLOOD PRESSURE: 152 MMHG | HEART RATE: 98 BPM

## 2017-12-24 DIAGNOSIS — F29 UNSPECIFIED PSYCHOSIS NOT DUE TO A SUBSTANCE OR KNOWN PHYSIOLOGICAL CONDITION: ICD-10-CM

## 2017-12-24 DIAGNOSIS — R69 ILLNESS, UNSPECIFIED: ICD-10-CM

## 2017-12-24 PROCEDURE — 99053 MED SERV 10PM-8AM 24 HR FAC: CPT

## 2017-12-24 PROCEDURE — 99283 EMERGENCY DEPT VISIT LOW MDM: CPT | Mod: 25

## 2017-12-24 PROCEDURE — 90792 PSYCH DIAG EVAL W/MED SRVCS: CPT

## 2017-12-24 NOTE — ED BEHAVIORAL HEALTH ASSESSMENT NOTE - DESCRIPTION
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

## 2017-12-24 NOTE — ED BEHAVIORAL HEALTH ASSESSMENT NOTE - HPI (INCLUDE ILLNESS QUALITY, SEVERITY, DURATION, TIMING, CONTEXT, MODIFYING FACTORS, ASSOCIATED SIGNS AND SYMPTOMS)
Pt is a 43 y/o AA male, domiciled w/ cousin (however living w/ mother since discharge from [facility] on 12/22/2017), employed, High School grad, single, no children; PPH of Psychosis and hx of developmental delay as per chart brought in by family due to "bizarre behavior". Mother contacted for collateral, stated the pt took his medication the first night (22nd) and stated on the 23rd, the pt began to express feeling he was being followed by other people by 2pm and was concerned about him. Father stated he took his medication on the 23rd @ 11pm prior to coming to the hospital (Risperdal 1mg qHS). Pt was seen and assessed at the bedside, stated today he wanted to go see Star Wars however family wanted him to be accompanied, left on his own, saw the movie, upon finishing he decided to go to his apartment to check on his cat but did not inform his parents who were worried; they were able to contact him and tell him to come back home as per pt; Pt stated he was "yelled at" when he got home and asked if he could go to the hospital, father brought pt after pt took his evening dose of medication. Pt during assessment denied having symptoms of depression, anxiety, delusions, auditory or visual hallucinations. Pt denied having any suicidal or homicidal ideation. Discussed finding w/ mother and father, mother stated she noticed the once a day doseage isn't enough as she stated his paranoid delusion began to return, mother was agreeable to give pt 0.5mg (1/2 tab) in the morning and 1mg (1 full tab) at night and will follow up w/ [facility] psychaitrist on Dec 28 2017. No other concerns; pt has not been aggressive or having any return of visual or auditory hallucinations.

## 2017-12-24 NOTE — ED BEHAVIORAL HEALTH ASSESSMENT NOTE - SUICIDE PROTECTIVE FACTORS
Responsibility to family and others/Future oriented/Supportive social network or family/Engaged in work or school/Ability to cope with stress/Identifies reasons for living/Fear of death or dying due to pain/suffering/High spirituality/Positive therapeutic relationships

## 2017-12-24 NOTE — ED BEHAVIORAL HEALTH ASSESSMENT NOTE - SUMMARY
Pt is a 43 y/o AA Male w/ PPH of unspecified psychosis, recently discharged from [facility] on 12/22/2017 who presented to [facility] ED brought in by his father due to "bizarre behavior"; Father and mother stating the pt had endorsed earlier in the day feeling as if he was being followed again, however after taking qHS dose of risperdal and during assessment in ED, pt denied having paranoia or other delusions; Denied symptoms of depression, anxiety, auditory or visual hallucinations. Pt denied having any suicidal or homicidal ideation. Pt does not meet criteria for an inpt psychiatry admission.

## 2017-12-24 NOTE — ED ADULT NURSE NOTE - CHIEF COMPLAINT QUOTE
Pt brought in by family for bizarre behavior, recently released from [facility].  Pt reports "not feeling any better, I have a dasia vu feeling."

## 2017-12-24 NOTE — ED BEHAVIORAL HEALTH ASSESSMENT NOTE - DETAILS
Recently discharged from [facility] on 12/22/2017; admitted for paranoia, VH, aggression spoke w/ mother and father pt does not meet criteria for an inpt psychiatry admission

## 2017-12-24 NOTE — ED BEHAVIORAL HEALTH ASSESSMENT NOTE - CASE SUMMARY
Patient is seen and evaluated by me, Agree with assessment   Pt is a 41 y/o AA Male w/ PPH of unspecified psychosis, recently discharged from [facility] on 12/22/2017 who presented to [facility] ED brought in by his father due to "bizarre behavior"; Father and mother stating the pt had endorsed earlier in the day feeling as if he was being followed again, however after taking qHS dose of risperdal and during assessment in ED, pt denied having paranoia or other delusions; Denied symptoms of depression, anxiety, auditory or visual hallucinations. Pt denied having any suicidal or homicidal ideation; he denies felling anxious.  Pt does not meet criteria for an inpt psychiatry admission.

## 2017-12-24 NOTE — ED ADULT TRIAGE NOTE - CHIEF COMPLAINT QUOTE
Pt brought in by family for bizarre behavior, recently released from [facility].  Pt reports "not feeling any better, I have a dasia vu feeling."

## 2017-12-24 NOTE — ED BEHAVIORAL HEALTH ASSESSMENT NOTE - OTHER PAST PSYCHIATRIC HISTORY (INCLUDE DETAILS REGARDING ONSET, COURSE OF ILLNESS, INPATIENT/OUTPATIENT TREATMENT)
Hx of one inpt psychiatry admission in [facility] w/ recent discharge on 12/22/2017. No hx of suicide attempts; non suicidal self injury

## 2017-12-24 NOTE — ED PROVIDER NOTE - OBJECTIVE STATEMENT
42M with PMH of developmental delay and recent admission to [facility] for new onset psychosis of unclear etiology who was brought in by his family because he could not be found for several hours today.  He states that he went to see the Star Wars movie and then went to his house to check on his cat.  He and his family deny any F/C/N/V/D/urinary sx/cough/sputum/HA/CP. He denies hallucinations, denies SI/HI and has no complaints.  COmpliant with his meds.

## 2017-12-24 NOTE — ED BEHAVIORAL HEALTH ASSESSMENT NOTE - RISK ASSESSMENT
Risk factors: Pt was recently discharged from an inpatient psychiatry unit; Protective factors: Pt denied having any symptoms of depression, anxiety, psychosis; Denied having any suicidal or homicidal ideation. Denied having any current delusions after taking night dose of medication; Does not meet criteria for an inpt psychaitry admission

## 2017-12-24 NOTE — ED ADULT NURSE NOTE - OBJECTIVE STATEMENT
Pt received to #4. Pt states he was involved in a verbal altercation with his mother after he returned home from going to see "star wars" at the movies then going to visit his cat without informing her of his whereabouts. Pt denies physical violence, denies SI/HI; denies medical complaints. Pt pleasant, calm and cooperative. Pts belongings secured for safety. Pt awaiting psychiatric evaluation.

## 2017-12-24 NOTE — ED PROVIDER NOTE - MEDICAL DECISION MAKING DETAILS
Cabot: 42M with PMH of DD and psychosis, well adult.  Psych has evaluated and feels that he is safe to go home.

## 2018-01-05 RX ORDER — RISPERIDONE 4 MG/1
1 TABLET ORAL
Qty: 30 | Refills: 0 | OUTPATIENT
Start: 2018-01-05 | End: 2018-02-03

## 2018-01-11 ENCOUNTER — EMERGENCY (EMERGENCY)
Facility: HOSPITAL | Age: 43
LOS: 1 days | Discharge: HOME | End: 2018-01-11
Admitting: EMERGENCY MEDICINE
Payer: MEDICAID

## 2018-01-11 VITALS
TEMPERATURE: 98 F | SYSTOLIC BLOOD PRESSURE: 127 MMHG | RESPIRATION RATE: 16 BRPM | HEART RATE: 101 BPM | OXYGEN SATURATION: 98 % | DIASTOLIC BLOOD PRESSURE: 82 MMHG

## 2018-01-11 DIAGNOSIS — F29 UNSPECIFIED PSYCHOSIS NOT DUE TO A SUBSTANCE OR KNOWN PHYSIOLOGICAL CONDITION: ICD-10-CM

## 2018-01-11 PROCEDURE — 90792 PSYCH DIAG EVAL W/MED SRVCS: CPT | Mod: GC

## 2018-01-11 PROCEDURE — 99284 EMERGENCY DEPT VISIT MOD MDM: CPT

## 2018-01-11 NOTE — ED BEHAVIORAL HEALTH ASSESSMENT NOTE - SUICIDE PROTECTIVE FACTORS
Responsibility to family and others/Supportive social network or family/Identifies reasons for living/Future oriented/Fear of death or dying due to pain/suffering/High spirituality/Engaged in work or school/Positive therapeutic relationships/Ability to cope with stress

## 2018-01-11 NOTE — ED BEHAVIORAL HEALTH ASSESSMENT NOTE - RISK ASSESSMENT
Patient's current risk factors include recent discharge form inpatient psychiatric unit and remote history of aborted suicidal gesture. Protective factors include no SI/HI AH/VH, future orientation, supportive social network, engaged in therapy/treathment, no access to guns, no substance abuse.

## 2018-01-11 NOTE — ED BEHAVIORAL HEALTH ASSESSMENT NOTE - HPI (INCLUDE ILLNESS QUALITY, SEVERITY, DURATION, TIMING, CONTEXT, MODIFYING FACTORS, ASSOCIATED SIGNS AND SYMPTOMS)
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 On assessment, patient is limited, but states he is unsure if he is "dreaming or awake" but "feels good." He states he dreams (at night) of good things that come true, such as going to the movies or eating lunch. He denies feeling as though others are out to get him, which was a problem during last admission. He denies any AH/VH or SI/HI. He denies depressed mood but notes issues with sleep, noting multiple nighttime awakenings. He reports a suicidal gesture in his 20s of cutting himself but not drawing blood. Otherwise, no recent aggression or self injury. He denies substance abuse or cigarette use. He notes being able to care for himself, including eating, showering, and brushing his teeth.     Collateral obtained from mother Hermelinda Victor reports she is concerned as patient told her that the TV is talking to him and that he isn't sure if he is dreaming or awake. She denies any reports of SI/HI or aggression at home. She notes being concerned that his follow up with a psychiatrist at [facility] is on 2/5, though when confirmed, it was found that he has an appointment in 2 days. Mother also notes concerns as she cannot obtain the EEG recommended to him until he is approved for Medicaid, which she is currently working on.    Collateral obtained from inpatient psychiatrist Dr. Sahu notes patient came in with paranoia towards his work, agitation and this "dasia vu" with regards to dreaming. The patient was calm on the unit and was titrated to 1 mg of Risperidone. After, he was brought in to the ER and titrated to 1.5 mg QHS. Patient's mother has also been reaching out to inpatient psychiatrist multiple times with concerns for psychosis, resulting in him sending prescription of Risperidone 2 mg, though mother did not administer it as she thought she was instructed to have him finish the 1.5 mg. He recommends EEG as presentation is not straightforward with recent new onset complicated by developmental delay.

## 2018-01-11 NOTE — ED BEHAVIORAL HEALTH ASSESSMENT NOTE - DETAILS
Recently discharged from [facility] on 12/22/2017; admitted for paranoia, VH, agitation spoke w/ mother spoke with Dr. Sahu who states patient was paranoid towards work on admission; never aggressive but agitated; mother has been calling him frequently for worries about psychosis so patient's risperidone was titrated up to 2 mg QHS; patient still needs EEG to r/o seizure activity cut himself with razor superficially

## 2018-01-11 NOTE — ED BEHAVIORAL HEALTH ASSESSMENT NOTE - CASE SUMMARY
Pt is a 42-year-old male with history of developmental delay and psychosis,, domiciled with cousin, reported to the mother that TV was speaking to him and feels as if he was dreaming. Like dasia goodwin. As per inpatient psychiatrist from recent admission, these Sx were present during Pt's last admission. Pt is not ani mminent danger to self or others and does notn meet the citeria for involuntary admission.

## 2018-01-11 NOTE — ED PROVIDER NOTE - OBJECTIVE STATEMENT
This is a 42 year old Male PMHX psychosis BIB family for psych eval     Patient arrived today stating This is a 42 year old Male PMHX psychosis BIB family for psych eval r/t Patient states " I don't know if I am psychotic, dreaming or awake". Reports compliant with Risperdal Denies SI/HI Denies AH/VH Denies ETOH/Illicit drugs. Spoke with mother Hermelinda who states patient is talking to himself and hallucinating  incorporating the TV into his thoughts.

## 2018-01-11 NOTE — ED ADULT TRIAGE NOTE - CHIEF COMPLAINT QUOTE
pt recently dc from [facility], pt having hallucinations today. pmhx schizophrenia denies si/hi

## 2018-01-11 NOTE — ED PROVIDER NOTE - MEDICAL DECISION MAKING DETAILS
This is a 42 year old Male PMHX psychosis BIB family for psych eval r/t Patient states " I don't know if I am psychotic, dreaming or awake". Reports compliant with Risperdal Denies SI/HI Denies AH/VH Denies ETOH/Illicit drugs. Spoke with mother Hermelinda who states patient is talking to himself and hallucinating  incorporating the TV into his thoughts. Medical evaluation performed. There is no clinical evidence of intoxication or any acute medical problem requiring immediate intervention. Final disposition will be determined by psychiatrist.

## 2018-01-11 NOTE — ED BEHAVIORAL HEALTH ASSESSMENT NOTE - SUMMARY
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 Patient denies any AH/VH/SI/HI and reports a feeling inconsistent with psychosis. It is uncertain if his symptom presentation is primarily psychotic or complicated by underlying medical reasons vs. behavioral. He would benefit from further outpatient work up and close follow up, but does not require acute inpatient hospitalization at this time.

## 2018-02-26 ENCOUNTER — APPOINTMENT (OUTPATIENT)
Dept: NEUROLOGY | Facility: HOSPITAL | Age: 43
End: 2018-02-26

## 2018-04-30 ENCOUNTER — OUTPATIENT (OUTPATIENT)
Dept: OUTPATIENT SERVICES | Facility: HOSPITAL | Age: 43
LOS: 1 days | End: 2018-04-30

## 2018-04-30 ENCOUNTER — APPOINTMENT (OUTPATIENT)
Dept: NEUROLOGY | Facility: HOSPITAL | Age: 43
End: 2018-04-30
Payer: MEDICAID

## 2018-04-30 VITALS — DIASTOLIC BLOOD PRESSURE: 81 MMHG | SYSTOLIC BLOOD PRESSURE: 127 MMHG | HEART RATE: 82 BPM | WEIGHT: 177.9 LBS

## 2018-04-30 DIAGNOSIS — F41.9 ANXIETY DISORDER, UNSPECIFIED: ICD-10-CM

## 2018-04-30 DIAGNOSIS — I10 ESSENTIAL (PRIMARY) HYPERTENSION: ICD-10-CM

## 2018-04-30 DIAGNOSIS — F31.30 BIPOLAR DISORDER, CURRENT EPISODE DEPRESSED, MILD OR MODERATE SEVERITY, UNSPECIFIED: ICD-10-CM

## 2018-04-30 DIAGNOSIS — R44.3 HALLUCINATIONS, UNSPECIFIED: ICD-10-CM

## 2018-04-30 DIAGNOSIS — D49.0 NEOPLASM OF UNSPECIFIED BEHAVIOR OF DIGESTIVE SYSTEM: ICD-10-CM

## 2018-04-30 DIAGNOSIS — Z87.898 PERSONAL HISTORY OF OTHER SPECIFIED CONDITIONS: ICD-10-CM

## 2018-04-30 DIAGNOSIS — Z84.1 FAMILY HISTORY OF DISORDERS OF KIDNEY AND URETER: ICD-10-CM

## 2018-04-30 DIAGNOSIS — F32.9 MAJOR DEPRESSIVE DISORDER, SINGLE EPISODE, UNSPECIFIED: ICD-10-CM

## 2018-04-30 PROCEDURE — 99205 OFFICE O/P NEW HI 60 MIN: CPT

## 2018-04-30 RX ORDER — RISPERIDONE 2 MG/1
2 TABLET ORAL
Refills: 0 | Status: ACTIVE | COMMUNITY

## 2018-05-02 DIAGNOSIS — F41.9 ANXIETY DISORDER, UNSPECIFIED: ICD-10-CM

## 2018-05-02 DIAGNOSIS — F31.30 BIPOLAR DISORDER, CURRENT EPISODE DEPRESSED, MILD OR MODERATE SEVERITY, UNSPECIFIED: ICD-10-CM

## 2018-05-02 DIAGNOSIS — F29 UNSPECIFIED PSYCHOSIS NOT DUE TO A SUBSTANCE OR KNOWN PHYSIOLOGICAL CONDITION: ICD-10-CM

## 2018-05-02 DIAGNOSIS — Z87.898 PERSONAL HISTORY OF OTHER SPECIFIED CONDITIONS: ICD-10-CM

## 2018-05-02 DIAGNOSIS — R44.3 HALLUCINATIONS, UNSPECIFIED: ICD-10-CM

## 2018-05-02 DIAGNOSIS — Z84.1 FAMILY HISTORY OF DISORDERS OF KIDNEY AND URETER: ICD-10-CM

## 2018-05-02 DIAGNOSIS — D49.0 NEOPLASM OF UNSPECIFIED BEHAVIOR OF DIGESTIVE SYSTEM: ICD-10-CM

## 2018-05-02 DIAGNOSIS — I10 ESSENTIAL (PRIMARY) HYPERTENSION: ICD-10-CM

## 2018-05-02 DIAGNOSIS — F32.9 MAJOR DEPRESSIVE DISORDER, SINGLE EPISODE, UNSPECIFIED: ICD-10-CM

## 2019-01-07 NOTE — ED ADULT TRIAGE NOTE - NS ED NOTE AC HIGH RISK COUNTRIES
Order rec'd for this pt to be followed by the [facility] today.  Writer speaks with charge RN Julia from [facility] informing her this order is incomplete, and a staff provider must sign the referral agreement, state an INR goal, and provide how long the pt is expected to be on coumadin.  Julia reports this will be relayed to the housestaff who initiated the referral.  Chilo ALVAREZ  
No

## 2019-09-27 NOTE — ED PROVIDER NOTE - RELIEVING FACTORS
Quality 110: Preventive Care And Screening: Influenza Immunization: Influenza immunization was not ordered or administered, reason not given Quality 130: Documentation Of Current Medications In The Medical Record: Current Medications Documented Quality 226: Preventive Care And Screening: Tobacco Use: Screening And Cessation Intervention: Patient screened for tobacco use and is an ex/non-smoker Detail Level: Generalized Quality 317: Preventative Care And Screening: Screening For High Blood Pressure And Follow-Up Documented: Pre-hypertensive or hypertensive blood pressure reading documented, and the indicated follow-up is documented Quality 265: Biopsy Follow-Up: Biopsy results reviewed, communicated, tracked, and documented Quality 431: Preventive Care And Screening: Unhealthy Alcohol Use - Screening: Patient screened for unhealthy alcohol use using a single question and scores less than 2 times per year none

## 2021-01-01 NOTE — ED BEHAVIORAL HEALTH ASSESSMENT NOTE - DESCRIPTION
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

## 2022-01-06 NOTE — ED PROVIDER NOTE - NS ED MD DISPO SPECIAL CONSIDERATION1
MEDICATIONS  (STANDING):  clonazePAM  Tablet 0.5 milliGRAM(s) Oral three times a day  risperiDONE  Disintegrating Tablet 2 milliGRAM(s) Oral at bedtime    MEDICATIONS  (PRN):  acetaminophen     Tablet .. 650 milliGRAM(s) Oral every 6 hours PRN Mild Pain (1 - 3), Moderate Pain (4 - 6)  aluminum hydroxide/magnesium hydroxide/simethicone Suspension 30 milliLiter(s) Oral every 6 hours PRN Dyspepsia  diphenhydrAMINE 25 milliGRAM(s) Oral every 6 hours PRN agitation, EPS prophylaxis  diphenhydrAMINE Injectable 25 milliGRAM(s) IntraMuscular once PRN agitation, EPS prophylaxis  haloperidol     Tablet 5 milliGRAM(s) Oral every 6 hours PRN agitation  haloperidol    Injectable 2 milliGRAM(s) IntraMuscular once PRN severe agitation  LORazepam     Tablet 2 milliGRAM(s) Oral every 6 hours PRN anxiety  LORazepam   Injectable 1 milliGRAM(s) IntraMuscular once PRN Agitation  magnesium hydroxide Suspension 30 milliLiter(s) Oral daily PRN Constipation  melatonin. 3 milliGRAM(s) Oral at bedtime PRN Insomnia   None

## 2022-07-20 NOTE — ED ADULT TRIAGE NOTE - PAIN RATING/NUMBER SCALE (0-10): REST
Detail Level: Zone
Render In Strict Bullet Format?: No
Samples Given: Vanicream anti dandruff shampoo
0

## 2023-04-04 NOTE — ED BEHAVIORAL HEALTH ASSESSMENT NOTE - NS ED [facility] MED ROS RESPIRATORY
Topical Retinoid Pregnancy And Lactation Text: This medication is Pregnancy Category C. It is unknown if this medication is excreted in breast milk. Aklief counseling:  Patient advised to apply a pea-sized amount only at bedtime and wait 30 minutes after washing their face before applying.  If too drying, patient may add a non-comedogenic moisturizer.  The most commonly reported side effects including irritation, redness, scaling, dryness, stinging, burning, itching, and increased risk of sunburn.  The patient verbalized understanding of the proper use and possible adverse effects of retinoids.  All of the patient's questions and concerns were addressed. Winlevi Counseling:  I discussed with the patient the risks of topical clascoterone including but not limited to erythema, scaling, itching, and stinging. Patient voiced their understanding. Include Pregnancy/Lactation Warning?: No Azithromycin Pregnancy And Lactation Text: This medication is considered safe during pregnancy and is also secreted in breast milk. Doxycycline Counseling:  Patient counseled regarding possible photosensitivity and increased risk for sunburn.  Patient instructed to avoid sunlight, if possible.  When exposed to sunlight, patients should wear protective clothing, sunglasses, and sunscreen.  The patient was instructed to call the office immediately if the following severe adverse effects occur:  hearing changes, easy bruising/bleeding, severe headache, or vision changes.  The patient verbalized understanding of the proper use and possible adverse effects of doxycycline.  All of the patient's questions and concerns were addressed. Benzoyl Peroxide Pregnancy And Lactation Text: This medication is Pregnancy Category C. It is unknown if benzoyl peroxide is excreted in breast milk. Topical Sulfur Applications Counseling: Topical Sulfur Counseling: Patient counseled that this medication may cause skin irritation or allergic reactions.  In the event of skin irritation, the patient was advised to reduce the amount of the drug applied or use it less frequently.   The patient verbalized understanding of the proper use and possible adverse effects of topical sulfur application.  All of the patient's questions and concerns were addressed. High Dose Vitamin A Pregnancy And Lactation Text: High dose vitamin A therapy is contraindicated during pregnancy and breast feeding. Tetracycline Counseling: Patient counseled regarding possible photosensitivity and increased risk for sunburn.  Patient instructed to avoid sunlight, if possible.  When exposed to sunlight, patients should wear protective clothing, sunglasses, and sunscreen.  The patient was instructed to call the office immediately if the following severe adverse effects occur:  hearing changes, easy bruising/bleeding, severe headache, or vision changes.  The patient verbalized understanding of the proper use and possible adverse effects of tetracycline.  All of the patient's questions and concerns were addressed. Patient understands to avoid pregnancy while on therapy due to potential birth defects. Erythromycin Counseling:  I discussed with the patient the risks of erythromycin including but not limited to GI upset, allergic reaction, drug rash, diarrhea, increase in liver enzymes, and yeast infections. Bactrim Pregnancy And Lactation Text: This medication is Pregnancy Category D and is known to cause fetal risk.  It is also excreted in breast milk. Minocycline Pregnancy And Lactation Text: This medication is Pregnancy Category D and not consider safe during pregnancy. It is also excreted in breast milk. Azelaic Acid Pregnancy And Lactation Text: This medication is considered safe during pregnancy and breast feeding. Spironolactone Counseling: Patient advised regarding risks of diarrhea, abdominal pain, hyperkalemia, birth defects (for female patients), liver toxicity and renal toxicity. The patient may need blood work to monitor liver and kidney function and potassium levels while on therapy. The patient verbalized understanding of the proper use and possible adverse effects of spironolactone.  All of the patient's questions and concerns were addressed. Topical Clindamycin Counseling: Patient counseled that this medication may cause skin irritation or allergic reactions.  In the event of skin irritation, the patient was advised to reduce the amount of the drug applied or use it less frequently.   The patient verbalized understanding of the proper use and possible adverse effects of clindamycin.  All of the patient's questions and concerns were addressed. Isotretinoin Pregnancy And Lactation Text: This medication is Pregnancy Category X and is considered extremely dangerous during pregnancy. It is unknown if it is excreted in breast milk. Detail Level: Zone Dapsone Counseling: I discussed with the patient the risks of dapsone including but not limited to hemolytic anemia, agranulocytosis, rashes, methemoglobinemia, kidney failure, peripheral neuropathy, headaches, GI upset, and liver toxicity.  Patients who start dapsone require monitoring including baseline LFTs and weekly CBCs for the first month, then every month thereafter.  The patient verbalized understanding of the proper use and possible adverse effects of dapsone.  All of the patient's questions and concerns were addressed. Erythromycin Pregnancy And Lactation Text: This medication is Pregnancy Category B and is considered safe during pregnancy. It is also excreted in breast milk. No complaints Birth Control Pills Counseling: Birth Control Pill Counseling: I discussed with the patient the potential side effects of OCPs including but not limited to increased risk of stroke, heart attack, thrombophlebitis, deep venous thrombosis, hepatic adenomas, breast changes, GI upset, headaches, and depression.  The patient verbalized understanding of the proper use and possible adverse effects of OCPs. All of the patient's questions and concerns were addressed. Minocycline Counseling: Patient advised regarding possible photosensitivity and discoloration of the teeth, skin, lips, tongue and gums.  Patient instructed to avoid sunlight, if possible.  When exposed to sunlight, patients should wear protective clothing, sunglasses, and sunscreen.  The patient was instructed to call the office immediately if the following severe adverse effects occur:  hearing changes, easy bruising/bleeding, severe headache, or vision changes.  The patient verbalized understanding of the proper use and possible adverse effects of minocycline.  All of the patient's questions and concerns were addressed. Bactrim Counseling:  I discussed with the patient the risks of sulfa antibiotics including but not limited to GI upset, allergic reaction, drug rash, diarrhea, dizziness, photosensitivity, and yeast infections.  Rarely, more serious reactions can occur including but not limited to aplastic anemia, agranulocytosis, methemoglobinemia, blood dyscrasias, liver or kidney failure, lung infiltrates or desquamative/blistering drug rashes. Doxycycline Pregnancy And Lactation Text: This medication is Pregnancy Category D and not consider safe during pregnancy. It is also excreted in breast milk but is considered safe for shorter treatment courses. Topical Sulfur Applications Pregnancy And Lactation Text: This medication is Pregnancy Category C and has an unknown safety profile during pregnancy. It is unknown if this topical medication is excreted in breast milk. Winlevi Pregnancy And Lactation Text: This medication is considered safe during pregnancy and breastfeeding. Aklief Pregnancy And Lactation Text: It is unknown if this medication is safe to use during pregnancy.  It is unknown if this medication is excreted in breast milk.  Breastfeeding women should use the topical cream on the smallest area of the skin for the shortest time needed while breastfeeding.  Do not apply to nipple and areola. Sarecycline Counseling: Patient advised regarding possible photosensitivity and discoloration of the teeth, skin, lips, tongue and gums.  Patient instructed to avoid sunlight, if possible.  When exposed to sunlight, patients should wear protective clothing, sunglasses, and sunscreen.  The patient was instructed to call the office immediately if the following severe adverse effects occur:  hearing changes, easy bruising/bleeding, severe headache, or vision changes.  The patient verbalized understanding of the proper use and possible adverse effects of sarecycline.  All of the patient's questions and concerns were addressed. Tazorac Counseling:  Patient advised that medication is irritating and drying.  Patient may need to apply sparingly and wash off after an hour before eventually leaving it on overnight.  The patient verbalized understanding of the proper use and possible adverse effects of tazorac.  All of the patient's questions and concerns were addressed. Topical Retinoid counseling:  Patient advised to apply a pea-sized amount only at bedtime and wait 30 minutes after washing their face before applying.  If too drying, patient may add a non-comedogenic moisturizer. The patient verbalized understanding of the proper use and possible adverse effects of retinoids.  All of the patient's questions and concerns were addressed. Birth Control Pills Pregnancy And Lactation Text: This medication should be avoided if pregnant and for the first 30 days post-partum. Tazorac Pregnancy And Lactation Text: This medication is not safe during pregnancy. It is unknown if this medication is excreted in breast milk. Isotretinoin Counseling: Patient should get monthly blood tests, not donate blood, not drive at night if vision affected, not share medication, and not undergo elective surgery for 6 months after tx completed. Side effects reviewed, pt to contact office should one occur. Azelaic Acid Counseling: Patient counseled that medicine may cause skin irritation and to avoid applying near the eyes.  In the event of skin irritation, the patient was advised to reduce the amount of the drug applied or use it less frequently.   The patient verbalized understanding of the proper use and possible adverse effects of azelaic acid.  All of the patient's questions and concerns were addressed. Azithromycin Counseling:  I discussed with the patient the risks of azithromycin including but not limited to GI upset, allergic reaction, drug rash, diarrhea, and yeast infections. Dapsone Pregnancy And Lactation Text: This medication is Pregnancy Category C and is not considered safe during pregnancy or breast feeding. Topical Clindamycin Pregnancy And Lactation Text: This medication is Pregnancy Category B and is considered safe during pregnancy. It is unknown if it is excreted in breast milk. Benzoyl Peroxide Counseling: Patient counseled that medicine may cause skin irritation and bleach clothing.  In the event of skin irritation, the patient was advised to reduce the amount of the drug applied or use it less frequently.   The patient verbalized understanding of the proper use and possible adverse effects of benzoyl peroxide.  All of the patient's questions and concerns were addressed. High Dose Vitamin A Counseling: Side effects reviewed, pt to contact office should one occur. Spironolactone Pregnancy And Lactation Text: This medication can cause feminization of the male fetus and should be avoided during pregnancy. The active metabolite is also found in breast milk.

## 2024-08-19 NOTE — PROGRESS NOTE BEHAVIORAL HEALTH - ATTENTION / CONCENTRATION
Admitting resident norman capone contacted via teams; pt pressure has been soft. Pt aox4
Admitting resident norman capone contacted via teams; pt pressure has been soft. Pt aox4, mentating well, [place] and following commands
Admitting resident norman capone contacted via teams regarding pt low BPs, awaiting further instructions. Pt aox4, mentating well, [place] and following commands
Admitting resident norman capone contacted via teams regarding pt low BPs, per MD, to come down and assess pt, no further RN interventions needed at this time. awaiting further instructions. Pt aox4, mentating well, K[place] and following commands
Normal
Normal

## 2025-05-31 NOTE — ED BEHAVIORAL HEALTH ASSESSMENT NOTE - MARITAL STATUS
05/31/25 1000   C-SSRS (Frequent Screen)   2. Have you actually had any thoughts of killing yourself? No   6. Have you done anything, started to do anything, or prepared to do anything to end your life? No   Suicide Evaluation Negative Screen - White     Nursing Suicide Assessment Note - Inpatient    Current assessment:    Current C-SSRS score: (P) Negative Screen - White      Protective Factors / Reason for Living: Future orientation    Interventions:   Q 15 minute safety checks    Other Interventions Implemented:     Single